# Patient Record
Sex: MALE | Race: WHITE | Employment: OTHER | URBAN - METROPOLITAN AREA
[De-identification: names, ages, dates, MRNs, and addresses within clinical notes are randomized per-mention and may not be internally consistent; named-entity substitution may affect disease eponyms.]

---

## 2019-04-01 ENCOUNTER — HOSPITAL ENCOUNTER (OUTPATIENT)
Dept: LAB | Age: 64
Discharge: HOME | End: 2019-04-01

## 2019-04-01 PROCEDURE — 88305 TISSUE EXAM BY PATHOLOGIST: CPT

## 2019-05-02 ENCOUNTER — HOSPITAL ENCOUNTER (OUTPATIENT)
Dept: SURGERY | Age: 64
Discharge: HOME | End: 2019-05-02
Attending: ORTHOPAEDIC SURGERY
Payer: MEDICARE

## 2019-05-02 VITALS
RESPIRATION RATE: 18 BRPM | BODY MASS INDEX: 33.18 KG/M2 | SYSTOLIC BLOOD PRESSURE: 116 MMHG | OXYGEN SATURATION: 96 % | DIASTOLIC BLOOD PRESSURE: 81 MMHG | TEMPERATURE: 96.1 F | HEIGHT: 77 IN | HEART RATE: 70 BPM | WEIGHT: 281 LBS

## 2019-05-02 LAB
ANION GAP SERPL CALC-SCNC: 9 MMOL/L
APPEARANCE UR: CLEAR
APTT PPP: 27.8 SEC (ref 24.7–39.8)
ATRIAL RATE: 227 BPM
BACTERIA SPEC CULT: NORMAL
BASOPHILS # BLD: 0 K/UL (ref 0–0.2)
BASOPHILS NFR BLD: 1 % (ref 0–2)
BILIRUB UR QL: NEGATIVE
BUN SERPL-MCNC: 13 MG/DL (ref 8–23)
CALCIUM SERPL-MCNC: 9.1 MG/DL (ref 8.3–10.4)
CALCULATED R AXIS, ECG10: 38 DEGREES
CALCULATED T AXIS, ECG11: 54 DEGREES
CHLORIDE SERPL-SCNC: 108 MMOL/L (ref 98–107)
CO2 SERPL-SCNC: 26 MMOL/L (ref 21–32)
COLOR UR: YELLOW
CREAT SERPL-MCNC: 0.94 MG/DL (ref 0.8–1.5)
DIAGNOSIS, 93000: NORMAL
DIFFERENTIAL METHOD BLD: ABNORMAL
EOSINOPHIL # BLD: 0.1 K/UL (ref 0–0.8)
EOSINOPHIL NFR BLD: 2 % (ref 0.5–7.8)
ERYTHROCYTE [DISTWIDTH] IN BLOOD BY AUTOMATED COUNT: 12.5 % (ref 11.9–14.6)
GLUCOSE SERPL-MCNC: 63 MG/DL (ref 65–100)
GLUCOSE UR STRIP.AUTO-MCNC: NEGATIVE MG/DL
HCT VFR BLD AUTO: 44.7 % (ref 41.1–50.3)
HGB BLD-MCNC: 15 G/DL (ref 13.6–17.2)
HGB UR QL STRIP: NEGATIVE
IMM GRANULOCYTES # BLD AUTO: 0 K/UL (ref 0–0.5)
IMM GRANULOCYTES NFR BLD AUTO: 0 % (ref 0–5)
INR PPP: 1
KETONES UR QL STRIP.AUTO: NEGATIVE MG/DL
LEUKOCYTE ESTERASE UR QL STRIP.AUTO: NEGATIVE
LYMPHOCYTES # BLD: 2 K/UL (ref 0.5–4.6)
LYMPHOCYTES NFR BLD: 34 % (ref 13–44)
MCH RBC QN AUTO: 32.9 PG (ref 26.1–32.9)
MCHC RBC AUTO-ENTMCNC: 33.6 G/DL (ref 31.4–35)
MCV RBC AUTO: 98 FL (ref 79.6–97.8)
MONOCYTES # BLD: 0.5 K/UL (ref 0.1–1.3)
MONOCYTES NFR BLD: 9 % (ref 4–12)
NEUTS SEG # BLD: 3.3 K/UL (ref 1.7–8.2)
NEUTS SEG NFR BLD: 55 % (ref 43–78)
NITRITE UR QL STRIP.AUTO: NEGATIVE
NRBC # BLD: 0 K/UL (ref 0–0.2)
PH UR STRIP: 6 [PH] (ref 5–9)
PLATELET # BLD AUTO: 168 K/UL (ref 150–450)
PMV BLD AUTO: 9.7 FL (ref 9.4–12.3)
POTASSIUM SERPL-SCNC: 4 MMOL/L (ref 3.5–5.1)
PROT UR STRIP-MCNC: NEGATIVE MG/DL
PROTHROMBIN TIME: 13.1 SEC (ref 11.7–14.5)
Q-T INTERVAL, ECG07: 386 MS
QRS DURATION, ECG06: 90 MS
QTC CALCULATION (BEZET), ECG08: 398 MS
RBC # BLD AUTO: 4.56 M/UL (ref 4.23–5.6)
SERVICE CMNT-IMP: NORMAL
SODIUM SERPL-SCNC: 143 MMOL/L (ref 136–145)
SP GR UR REFRACTOMETRY: 1.01 (ref 1–1.02)
UROBILINOGEN UR QL STRIP.AUTO: 1 EU/DL (ref 0.2–1)
VENTRICULAR RATE, ECG03: 64 BPM
WBC # BLD AUTO: 6 K/UL (ref 4.3–11.1)

## 2019-05-02 PROCEDURE — 81003 URINALYSIS AUTO W/O SCOPE: CPT

## 2019-05-02 PROCEDURE — 85025 COMPLETE CBC W/AUTO DIFF WBC: CPT

## 2019-05-02 PROCEDURE — 93005 ELECTROCARDIOGRAM TRACING: CPT | Performed by: ANESTHESIOLOGY

## 2019-05-02 PROCEDURE — 87641 MR-STAPH DNA AMP PROBE: CPT

## 2019-05-02 PROCEDURE — 80048 BASIC METABOLIC PNL TOTAL CA: CPT

## 2019-05-02 PROCEDURE — 85730 THROMBOPLASTIN TIME PARTIAL: CPT

## 2019-05-02 PROCEDURE — 85610 PROTHROMBIN TIME: CPT

## 2019-05-02 RX ORDER — CELECOXIB 200 MG/1
200 CAPSULE ORAL DAILY
COMMUNITY

## 2019-05-02 RX ORDER — TAMSULOSIN HYDROCHLORIDE 0.4 MG/1
0.4 CAPSULE ORAL DAILY
COMMUNITY

## 2019-05-02 RX ORDER — HYDROCODONE BITARTRATE AND ACETAMINOPHEN 7.5; 325 MG/1; MG/1
1 TABLET ORAL
COMMUNITY
End: 2019-09-13

## 2019-05-02 RX ORDER — IBUPROFEN 200 MG
600 TABLET ORAL
COMMUNITY
End: 2019-09-13

## 2019-05-02 NOTE — PERIOP NOTES
Patient verified name and  Order for consent was found in EHR and matches case posting; patient verified. Type 3 surgery, PAT walk in assessment complete. Labs per surgeon: CBC, BMP, PT/PTT, UA and MRSA swab; results within anesthesia guidelines Labs per anesthesia protocol: No additional labs required EKG:  EKG tracing with AFIB noted. Patient denies any prior diagnosis of Afib. Dr. New Sanders notified and stated patient needed cardiac clearance prior to surgery. Left message with Rowdy Beltre at Dr. Edmundo Arce office patient needed cardiac clearance prior to surgery and he does not have cardiologist. 
 
Sharron Winter request for last office visit and any cardiac records from Dr. Erin Louis office in [facility]. Hibiclens and instructions given per hospital policy. Patient provided with and instructed on educational handouts including Guide to Surgery, Pain Management, Hand Hygiene, Blood Transfusion Education, and [place] [facility] Brochure. Patient answered medical/surgical history questions at their best of ability. All prior to admission medications documented in [facility]. Original medication prescription bottle not visualized during patient appointment. Patient instructed to hold all vitamins 7 days prior to surgery and NSAIDS 5 days prior to surgery, patient verbalized understanding. Prescription medications to hold: Ibuprofen x 5 days Patient instructed to continue previous medications as prescribed prior to surgery and to take the following medications the day of surgery according to anesthesia guidelines with a small sip of water: Celebrex, Hydrocodone and Flomax. Patient teach back successful and patient demonstrates knowledge of instructions.

## 2019-05-02 NOTE — PERIOP NOTES
Lab results within anesthesia guidelines. Recent Results (from the past 12 hour(s)) CBC WITH AUTOMATED DIFF Collection Time: 05/02/19 10:05 AM  
Result Value Ref Range WBC 6.0 4.3 - 11.1 K/uL  
 RBC 4.56 4.23 - 5.6 M/uL  
 HGB 15.0 13.6 - 17.2 g/dL HCT 44.7 41.1 - 50.3 % MCV 98.0 (H) 79.6 - 97.8 FL  
 MCH 32.9 26.1 - 32.9 PG  
 MCHC 33.6 31.4 - 35.0 g/dL  
 RDW 12.5 11.9 - 14.6 % PLATELET 436 498 - 011 K/uL MPV 9.7 9.4 - 12.3 FL ABSOLUTE NRBC 0.00 0.0 - 0.2 K/uL  
 DF AUTOMATED NEUTROPHILS 55 43 - 78 % LYMPHOCYTES 34 13 - 44 % MONOCYTES 9 4.0 - 12.0 % EOSINOPHILS 2 0.5 - 7.8 % BASOPHILS 1 0.0 - 2.0 % IMMATURE GRANULOCYTES 0 0.0 - 5.0 %  
 ABS. NEUTROPHILS 3.3 1.7 - 8.2 K/UL  
 ABS. LYMPHOCYTES 2.0 0.5 - 4.6 K/UL  
 ABS. MONOCYTES 0.5 0.1 - 1.3 K/UL  
 ABS. EOSINOPHILS 0.1 0.0 - 0.8 K/UL  
 ABS. BASOPHILS 0.0 0.0 - 0.2 K/UL  
 ABS. IMM. GRANS. 0.0 0.0 - 0.5 K/UL METABOLIC PANEL, BASIC Collection Time: 05/02/19 10:05 AM  
Result Value Ref Range Sodium 143 136 - 145 mmol/L Potassium 4.0 3.5 - 5.1 mmol/L Chloride 108 (H) 98 - 107 mmol/L  
 CO2 26 21 - 32 mmol/L Anion gap 9 mmol/L Glucose 63 (L) 65 - 100 mg/dL BUN 13 8 - 23 MG/DL Creatinine 0.94 0.8 - 1.5 MG/DL  
 GFR est AA >60 >60 ml/min/1.73m2 GFR est non-AA >60 ml/min/1.73m2 Calcium 9.1 8.3 - 10.4 MG/DL PROTHROMBIN TIME + INR Collection Time: 05/02/19 10:05 AM  
Result Value Ref Range Prothrombin time 13.1 11.7 - 14.5 sec INR 1.0    
PTT Collection Time: 05/02/19 10:05 AM  
Result Value Ref Range aPTT 27.8 24.7 - 39.8 SEC URINALYSIS W/ RFLX MICROSCOPIC Collection Time: 05/02/19 10:56 AM  
Result Value Ref Range Color YELLOW Appearance CLEAR Specific gravity 1.013 1.001 - 1.023    
 pH (UA) 6.0 5.0 - 9.0 Protein NEGATIVE  NEG mg/dL Glucose NEGATIVE  mg/dL Ketone NEGATIVE  NEG mg/dL Bilirubin NEGATIVE  NEG  Blood NEGATIVE  NEG    
 Urobilinogen 1.0 0.2 - 1.0 EU/dL Nitrites NEGATIVE  NEG  Leukocyte Esterase NEGATIVE  NEG

## 2019-05-03 NOTE — PERIOP NOTES
Minoo Hale (MA to surgeon) returned call and stated pt has cardiac clearance appt with Dr Alejandro Navarro [facility]) on Tuesday, 5/7/19 at 1100. Will have PAT charge RN follow up after appt 5/7/19.

## 2019-05-06 NOTE — ADVANCED PRACTICE NURSE
Total Joint Surgery Preoperative Chart Review Patient ID: [facility] Cadence ROJAS [id number] 
71 y.o. 
1955 Surgeon: Dr. Chaz Bliss Date of Surgery: 2019 Procedure: Total Right Knee Arthroplasty Primary Care Physician: Niraj Flaherty [phone] Specialty Physician(s):   
 
Subjective:  
[facility] Cadence ROJAS is a 71 y.o. WHITE OR  male who presents for preoperative evaluation for Total Right Knee arthroplasty. This is a preoperative chart review note based on data collected by the nurse at the surgical Pre-Assessment visit. Past Medical History:  
Diagnosis Date  Arthritis  BPH associated with nocturia   
 managed with medication  Chronic pain   
 lower back and legs Past Surgical History:  
Procedure Laterality Date  HX COLONOSCOPY    
 5 polyps removed  HX HERNIA REPAIR Right  [facility] Dr. Lobito Powell  HX KNEE ARTHROSCOPY Right  States \"either  or \" No family history on file. Social History Tobacco Use  Smoking status: Former Smoker Last attempt to quit: 2016 Years since quittin.3  Smokeless tobacco: Never Used Substance Use Topics  Alcohol use: Not on file Comment: occasional  
   
Prior to Admission medications Medication Sig Start Date End Date Taking? Authorizing Provider  
ibuprofen (ADVIL) 200 mg tablet Take 600 mg by mouth every six (6) hours as needed for Pain. Yes Provider, Historical  
HYDROcodone-acetaminophen (NORCO) 7.5-325 mg per tablet Take 1 Tab by mouth every six (6) hours as needed for Pain. Yes Provider, Historical  
celecoxib (CELEBREX) 200 mg capsule Take 200 mg by mouth daily. Yes Provider, Historical  
tamsulosin (FLOMAX) 0.4 mg capsule Take 0.4 mg by mouth daily. Yes Provider, Historical  
 
No Known Allergies Objective:  
 
Physical Exam:  
 
 
ECG:   
EKG Results Procedure 720 Value Units Date/Time EKG, 12 LEAD, INITIAL [[id number]] Collected:  05/02/19 0914 Order Status:  Completed Updated:  05/02/19 1231 Ventricular Rate 64 BPM   
  Atrial Rate 227 BPM   
  QRS Duration 90 ms Q-T Interval 386 ms QTC Calculation (Bezet) 398 ms Calculated R Axis 38 degrees Calculated T Axis 54 degrees Diagnosis -- Atrial fibrillation with premature ventricular or aberrantly conducted  
complexes Cannot rule out Anterior infarct (cited on or before 02-MAY-2019) Abnormal ECG When compared with ECG of 02-MAY-2019 09:13, 
Previous ECG has undetermined rhythm, needs review Confirmed by Francisca Srinivasan MD (), Robert Arango ([id number]) on 5/2/2019 12:30:43 PM 
  
  
 
 
Data Review:  
Labs:  
 
Results for Rigo Machado \"DENVER\" (MRN [id number]) as of 5/6/2019 15:46 Ref. Range 5/2/2019 10:05 Sodium Latest Ref Range: 136 - 145 mmol/L 143 Potassium Latest Ref Range: 3.5 - 5.1 mmol/L 4.0 Chloride Latest Ref Range: 98 - 107 mmol/L 108 (H) CO2 Latest Ref Range: 21 - 32 mmol/L 26 Anion gap Latest Units: mmol/L 9 Glucose Latest Ref Range: 65 - 100 mg/dL 63 (L) BUN Latest Ref Range: 8 - 23 MG/DL 13 Creatinine Latest Ref Range: 0.8 - 1.5 MG/DL 0.94 Calcium Latest Ref Range: 8.3 - 10.4 MG/DL 9.1 GFR est non-AA Latest Units: ml/min/1.73m2 >60  
GFR est AA Latest Ref Range: >60 ml/min/1.73m2 >60 Total Joint Surgery Pre-Assessment Recommendations:          
none Signed By: YAMIL Fierro May 6, 2019

## 2019-05-07 NOTE — PERIOP NOTES
[facility] office note dated 5/7/19 in EMR under Care Everywhere that reads: 
 
Assessment & Plan Note - Barbara Hogan MD - 05/07/2019 11:51 AM EDT Associated Problem(s): Preop cardiovascular exam   
No angina or CHF or overt high risk features, but new AF and LBBB--needs further evaluation before elective surgery   
 
 
 
Pt started on Eliquis today and echo, stress, Holter and 
pulm referral needed per note. Lachelle Cameron @ Dr. Rodriguez Call' office notified of above.

## 2019-09-05 ENCOUNTER — HOSPITAL ENCOUNTER (OUTPATIENT)
Dept: SURGERY | Age: 64
Discharge: HOME | End: 2019-09-05
Attending: ORTHOPAEDIC SURGERY
Payer: MEDICARE

## 2019-09-05 VITALS
TEMPERATURE: 98.2 F | WEIGHT: 272 LBS | SYSTOLIC BLOOD PRESSURE: 148 MMHG | RESPIRATION RATE: 18 BRPM | HEART RATE: 69 BPM | BODY MASS INDEX: 32.12 KG/M2 | OXYGEN SATURATION: 94 % | DIASTOLIC BLOOD PRESSURE: 91 MMHG | HEIGHT: 77 IN

## 2019-09-05 LAB
ANION GAP SERPL CALC-SCNC: 8 MMOL/L (ref 7–16)
APTT PPP: 29.2 SEC (ref 24.7–39.8)
BACTERIA SPEC CULT: NORMAL
BUN SERPL-MCNC: 13 MG/DL (ref 8–23)
CALCIUM SERPL-MCNC: 9.1 MG/DL (ref 8.3–10.4)
CHLORIDE SERPL-SCNC: 109 MMOL/L (ref 98–107)
CO2 SERPL-SCNC: 24 MMOL/L (ref 21–32)
CREAT SERPL-MCNC: 0.75 MG/DL (ref 0.8–1.5)
ERYTHROCYTE [DISTWIDTH] IN BLOOD BY AUTOMATED COUNT: 12.2 % (ref 11.9–14.6)
GLUCOSE SERPL-MCNC: 97 MG/DL (ref 65–100)
HCT VFR BLD AUTO: 40.9 % (ref 41.1–50.3)
HGB BLD-MCNC: 14.1 G/DL (ref 13.6–17.2)
INR PPP: 1
MCH RBC QN AUTO: 33.1 PG (ref 26.1–32.9)
MCHC RBC AUTO-ENTMCNC: 34.5 G/DL (ref 31.4–35)
MCV RBC AUTO: 96 FL (ref 79.6–97.8)
NRBC # BLD: 0 K/UL (ref 0–0.2)
PLATELET # BLD AUTO: 181 K/UL (ref 150–450)
PMV BLD AUTO: 9.3 FL (ref 9.4–12.3)
POTASSIUM SERPL-SCNC: 4.1 MMOL/L (ref 3.5–5.1)
PROTHROMBIN TIME: 12.9 SEC (ref 11.7–14.5)
RBC # BLD AUTO: 4.26 M/UL (ref 4.23–5.6)
SERVICE CMNT-IMP: NORMAL
SODIUM SERPL-SCNC: 141 MMOL/L (ref 136–145)
WBC # BLD AUTO: 5 K/UL (ref 4.3–11.1)

## 2019-09-05 PROCEDURE — 87641 MR-STAPH DNA AMP PROBE: CPT

## 2019-09-05 PROCEDURE — 85730 THROMBOPLASTIN TIME PARTIAL: CPT

## 2019-09-05 PROCEDURE — 85027 COMPLETE CBC AUTOMATED: CPT

## 2019-09-05 PROCEDURE — 85610 PROTHROMBIN TIME: CPT

## 2019-09-05 PROCEDURE — 80048 BASIC METABOLIC PNL TOTAL CA: CPT

## 2019-09-05 RX ORDER — ASPIRIN 81 MG/1
81 TABLET ORAL DAILY
COMMUNITY
End: 2019-09-13

## 2019-09-05 NOTE — PERIOP NOTES
Labs dated 9/5/19 routed via [facility] to patients PCP, Dr. Perez Daily, per Dr. Robbin Herrmann' request.    The below lab results are within anesthesia limits, no further action is required.      Recent Results (from the past 12 hour(s))   CBC W/O DIFF    Collection Time: 09/05/19 10:45 AM   Result Value Ref Range    WBC 5.0 4.3 - 11.1 K/uL    RBC 4.26 4.23 - 5.6 M/uL    HGB 14.1 13.6 - 17.2 g/dL    HCT 40.9 (L) 41.1 - 50.3 %    MCV 96.0 79.6 - 97.8 FL    MCH 33.1 (H) 26.1 - 32.9 PG    MCHC 34.5 31.4 - 35.0 g/dL    RDW 12.2 11.9 - 14.6 %    PLATELET 556 319 - 125 K/uL    MPV 9.3 (L) 9.4 - 12.3 FL    ABSOLUTE NRBC 0.00 0.0 - 0.2 K/uL   METABOLIC PANEL, BASIC    Collection Time: 09/05/19 10:45 AM   Result Value Ref Range    Sodium 141 136 - 145 mmol/L    Potassium 4.1 3.5 - 5.1 mmol/L    Chloride 109 (H) 98 - 107 mmol/L    CO2 24 21 - 32 mmol/L    Anion gap 8 7 - 16 mmol/L    Glucose 97 65 - 100 mg/dL    BUN 13 8 - 23 MG/DL    Creatinine 0.75 (L) 0.8 - 1.5 MG/DL    GFR est AA >60 >60 ml/min/1.73m2    GFR est non-AA >60 >60 ml/min/1.73m2    Calcium 9.1 8.3 - 10.4 MG/DL   PROTHROMBIN TIME + INR    Collection Time: 09/05/19 10:45 AM   Result Value Ref Range    Prothrombin time 12.9 11.7 - 14.5 sec    INR 1.0     PTT    Collection Time: 09/05/19 10:45 AM   Result Value Ref Range    aPTT 29.2 24.7 - 39.8 SEC

## 2019-09-05 NOTE — PERIOP NOTES
Recent Results (from the past 12 hour(s))   CBC W/O DIFF    Collection Time: 09/05/19 10:45 AM   Result Value Ref Range    WBC 5.0 4.3 - 11.1 K/uL    RBC 4.26 4.23 - 5.6 M/uL    HGB 14.1 13.6 - 17.2 g/dL    HCT 40.9 (L) 41.1 - 50.3 %    MCV 96.0 79.6 - 97.8 FL    MCH 33.1 (H) 26.1 - 32.9 PG    MCHC 34.5 31.4 - 35.0 g/dL    RDW 12.2 11.9 - 14.6 %    PLATELET 536 021 - 827 K/uL    MPV 9.3 (L) 9.4 - 12.3 FL    ABSOLUTE NRBC 0.00 0.0 - 0.2 K/uL   METABOLIC PANEL, BASIC    Collection Time: 09/05/19 10:45 AM   Result Value Ref Range    Sodium 141 136 - 145 mmol/L    Potassium 4.1 3.5 - 5.1 mmol/L    Chloride 109 (H) 98 - 107 mmol/L    CO2 24 21 - 32 mmol/L    Anion gap 8 7 - 16 mmol/L    Glucose 97 65 - 100 mg/dL    BUN 13 8 - 23 MG/DL    Creatinine 0.75 (L) 0.8 - 1.5 MG/DL    GFR est AA >60 >60 ml/min/1.73m2    GFR est non-AA >60 >60 ml/min/1.73m2    Calcium 9.1 8.3 - 10.4 MG/DL   PROTHROMBIN TIME + INR    Collection Time: 09/05/19 10:45 AM   Result Value Ref Range    Prothrombin time 12.9 11.7 - 14.5 sec    INR 1.0     PTT    Collection Time: 09/05/19 10:45 AM   Result Value Ref Range    aPTT 29.2 24.7 - 39.8 SEC

## 2019-09-05 NOTE — PERIOP NOTES
Patient verified name and . Order for consent found in EHR and matches case posting; patient verified. Type 3 surgery, PAT walk-in joint assessment complete. Labs per surgeon: cbc, bmp, pt/inr, ptt; results pending. T&S DOS; order signed and held in EHR. Labs per anesthesia protocol: All required lab work included in surgeon's orders. EKG: completed 19 at Cardiology office visit--tracing requested to be faxed to [phone]. Charge nurse to follow up. Patient was dx with new onset afib during previous joint camp on 19. Shabbir and DCCV performed on 19 patient has maintained NSR since. Eliquis d/c at cardiology office visit on 19. Cardiology note (19) placed on chart if needed for anesthesia reference. Echo (19) and Stress (19) located in EHR under Care Everywhere if needed for anesthesia protocol. Patient has previous incentive spirometer from previous joint camp. Patient provided verbal and written instructions on incentive spirometer use. Patient instructed to bring incentive spirometer to the hospital on the DOS. Patient verbalized understanding. MRSA/MSSA swab collected; pharmacy to review and dose antibiotic as appropriate. Hospital approved surgical skin cleanser and instructions to return bottle on DOS given per hospital policy. Patient provided with handouts including Guide to Surgery, Pain Management, Hand Hygiene, Blood Transfusion Education, and [place] [facility] Brochure. Patient answered medical/surgical history questions at their best of ability. All prior to admission medications documented in [facility] Care. Original medication prescription bottle NOT visualized during patient appointment. Patient instructed to hold all vitamins/supplements/herbals 7 days prior to surgery and NSAIDS 5 days prior to surgery.     Due to hx of atrial fibrillation, patient instructed to begin taking a daily 81 mg ASA per anesthesia protocol. Patient instructed to continue previous medications as prescribed prior to surgery and to take the following medications the day of surgery according to anesthesia guidelines with a small sip of water: 81 mg ASA and Hydrocodone PRN. Patient teach back successful and patient demonstrates knowledge of instruction.

## 2019-09-10 ENCOUNTER — ANESTHESIA EVENT (OUTPATIENT)
Dept: SURGERY | Age: 64
DRG: 470 | End: 2019-09-10
Payer: MEDICARE

## 2019-09-10 NOTE — H&P
[facility]  Pre Operative History and Physical Exam    Patient ID:  Cornelius Vargas  [id number]  89 y.o.  1955    Today: September 10, 2019       Assessment:   1. Arthritis of the right knee        Plan:    1. Proceed with scheduled Procedure(s) (LRB):  KNEE ARTHROPLASTY TOTAL/ RIGHT/ MAE/ FNB (Right)            CC:  Right knee pain    HPI:   The patient has end stage arthritis of the right knee. The patient was evaluated and examined during a consultation prior to this office visit. There have been no changes to the patient's orthopedic condition since the initial consultation. The patient has failed previous conservative treatment for this condition including antiinflammatories , and lifestyle modifications. The necessity for joint replacement is present. The patient will be admitted the day of surgery for Procedure(s) (LRB):  KNEE ARTHROPLASTY TOTAL/ RIGHT/ MAE/ FNB (Right)      Past Medical/Surgical History:  Past Medical History:   Diagnosis Date    Arthritis     Atrial fibrillation ([address] Dx 5/2019    BRIT and DCCV performed (6/20/19) and he has maintained NSR since.  EKG dated 7/19/19 shows NRS    BPH associated with nocturia     managed with medication    Chronic pain     lower back and legs    H/O echocardiogram 07/18/2019    EF 55-65%    LBBB (left bundle branch block)     Stress (5/21/19) \"no ischemia,\" Echo (7/18/19) EF 55-65%     Past Surgical History:   Procedure Laterality Date    HX AFIB ABLATION      cardioversion     HX COLONOSCOPY  2019    5 polyps removed    HX HERNIA REPAIR Right 2014    [facility]. Asiya Baptiste HX KNEE ARTHROSCOPY Right 2005    States \"either 2005 or 2007\"        Allergies: No Known Allergies     Physical Exam:   General: NAD, Alert, Oriented, Appears their stated age     [de-identified]: NC/AT, PERRL    Skin: No rashes, lesions or wounds seen      Psych: normal affect      Heart: Regular Rate, Rhythm     Lungs: unlabored respirations, normal breath sounds     Abdomen: Soft and non-distended     Ortho: Pain with limited ROM of the right knee    Neuro: no focal defects, sensation is equal bilaterally     Lymph: no lymphadenopathy     Meds:   No current facility-administered medications for this encounter. Current Outpatient Medications   Medication Sig    aspirin delayed-release 81 mg tablet Take 81 mg by mouth daily.  ibuprofen (ADVIL) 200 mg tablet Take 600 mg by mouth every six (6) hours as needed for Pain.  HYDROcodone-acetaminophen (NORCO) 7.5-325 mg per tablet Take 1 Tab by mouth every six (6) hours as needed for Pain.  celecoxib (CELEBREX) 200 mg capsule Take 200 mg by mouth daily.  tamsulosin (FLOMAX) 0.4 mg capsule Take 0.4 mg by mouth daily. Labs:  Hospital Outpatient Visit on 09/05/2019   Component Date Value Ref Range Status    WBC 09/05/2019 5.0  4.3 - 11.1 K/uL Final    RBC 09/05/2019 4.26  4.23 - 5.6 M/uL Final    HGB 09/05/2019 14.1  13.6 - 17.2 g/dL Final    HCT 09/05/2019 40.9* 41.1 - 50.3 % Final    MCV 09/05/2019 96.0  79.6 - 97.8 FL Final    MCH 09/05/2019 33.1* 26.1 - 32.9 PG Final    MCHC 09/05/2019 34.5  31.4 - 35.0 g/dL Final    RDW 09/05/2019 12.2  11.9 - 14.6 % Final    PLATELET 99/45/9411 426  150 - 450 K/uL Final    MPV 09/05/2019 9.3* 9.4 - 12.3 FL Final    ABSOLUTE NRBC 09/05/2019 0.00  0.0 - 0.2 K/uL Final    **Note: Absolute NRBC parameter is now reported with Hemogram**    Sodium 09/05/2019 141  136 - 145 mmol/L Final    Potassium 09/05/2019 4.1  3.5 - 5.1 mmol/L Final    Chloride 09/05/2019 109* 98 - 107 mmol/L Final    CO2 09/05/2019 24  21 - 32 mmol/L Final    Anion gap 09/05/2019 8  7 - 16 mmol/L Final    Glucose 09/05/2019 97  65 - 100 mg/dL Final    Comment: 47 - 60 mg/dl Consistent with, but not fully diagnostic of hypoglycemia.   101 - 125 mg/dl Impaired fasting glucose/consistent with pre-diabetes mellitus  > 126 mg/dl Fasting glucose consistent with overt diabetes mellitus      BUN 09/05/2019 13  8 - 23 MG/DL Final    Creatinine 09/05/2019 0.75* 0.8 - 1.5 MG/DL Final    GFR est AA 09/05/2019 >60  >60 ml/min/1.73m2 Final    GFR est non-AA 09/05/2019 >60  >60 ml/min/1.73m2 Final    Comment: (NOTE)  Estimated GFR is calculated using the Modification of Diet in Renal   Disease (MDRD) Study equation, reported for both  Americans   (GFRAA) and non- Americans (GFRNA), and normalized to 1.73m2   body surface area. The physician must decide which value applies to   the patient. The MDRD study equation should only be used in   individuals age 25 or older. It has not been validated for the   following: pregnant women, patients with serious comorbid conditions,   or on certain medications, or persons with extremes of body size,   muscle mass, or nutritional status.  Calcium 09/05/2019 9.1  8.3 - 10.4 MG/DL Final    Prothrombin time 09/05/2019 12.9  11.7 - 14.5 sec Final    INR 09/05/2019 1.0    Final    Comment: Suggested therapeutic INR range:  Venous thrombosis and embolus  2.0-3.0  Prosthetic heart valve         2.5-3.5  ** Note new reference range and method **      aPTT 09/05/2019 29.2  24.7 - 39.8 SEC Final    Comment: Heparin Therapeutic Range = 74 - 123 seconds  In addition to factor deficiency, monitoring heparin therapy, etc., evaluation of a prolonged aPTT result should include consideration of preanalytic variables such as heparin flush contamination, specimen integrity issues, etc.      Special Requests: 09/05/2019 NASAL    Final    Culture result: 09/05/2019 SA target not detected. A MRSA NEGATIVE, SA NEGATIVE test result does not preclude MRSA or SA nasal colonization. Final                 There is no problem list on file for this patient.         Signed By: Maribel Carmona NP  September 10, 2019

## 2019-09-11 ENCOUNTER — ANESTHESIA (OUTPATIENT)
Dept: SURGERY | Age: 64
DRG: 470 | End: 2019-09-11
Payer: MEDICARE

## 2019-09-11 ENCOUNTER — HOSPITAL ENCOUNTER (INPATIENT)
Age: 64
LOS: 2 days | Discharge: HOME HEALTH | DRG: 470 | End: 2019-09-13
Attending: ORTHOPAEDIC SURGERY | Admitting: ORTHOPAEDIC SURGERY
Payer: MEDICARE

## 2019-09-11 DIAGNOSIS — Z96.651 STATUS POST TOTAL KNEE REPLACEMENT, RIGHT: Primary | ICD-10-CM

## 2019-09-11 PROBLEM — M17.11 OSTEOARTHRITIS OF RIGHT KNEE: Status: ACTIVE | Noted: 2019-09-11

## 2019-09-11 PROBLEM — M19.90 OSTEOARTHRITIS: Status: ACTIVE | Noted: 2019-09-11

## 2019-09-11 LAB
GLUCOSE BLD STRIP.AUTO-MCNC: 90 MG/DL (ref 65–100)
HGB BLD-MCNC: 12.5 G/DL (ref 13.6–17.2)

## 2019-09-11 PROCEDURE — 76010000162 HC OR TIME 1.5 TO 2 HR INTENSV-TIER 1: Performed by: ORTHOPAEDIC SURGERY

## 2019-09-11 PROCEDURE — 97110 THERAPEUTIC EXERCISES: CPT

## 2019-09-11 PROCEDURE — 77030018836 HC SOL IRR NACL ICUM -A: Performed by: ORTHOPAEDIC SURGERY

## 2019-09-11 PROCEDURE — 77010033678 HC OXYGEN DAILY

## 2019-09-11 PROCEDURE — 77030035236 HC SUT PDS STRATFX BARB J&J -B: Performed by: ORTHOPAEDIC SURGERY

## 2019-09-11 PROCEDURE — 76060000034 HC ANESTHESIA 1.5 TO 2 HR: Performed by: ORTHOPAEDIC SURGERY

## 2019-09-11 PROCEDURE — 36415 COLL VENOUS BLD VENIPUNCTURE: CPT

## 2019-09-11 PROCEDURE — 77030002912 HC SUT ETHBND J&J -A: Performed by: ORTHOPAEDIC SURGERY

## 2019-09-11 PROCEDURE — 74011000258 HC RX REV CODE- 258: Performed by: ORTHOPAEDIC SURGERY

## 2019-09-11 PROCEDURE — 94762 N-INVAS EAR/PLS OXIMTRY CONT: CPT

## 2019-09-11 PROCEDURE — 77030008462 HC STPLR SKN PROX J&J -A: Performed by: ORTHOPAEDIC SURGERY

## 2019-09-11 PROCEDURE — 97165 OT EVAL LOW COMPLEX 30 MIN: CPT

## 2019-09-11 PROCEDURE — 0SRC0JA REPLACEMENT OF RIGHT KNEE JOINT WITH SYNTHETIC SUBSTITUTE, UNCEMENTED, OPEN APPROACH: ICD-10-PCS | Performed by: ORTHOPAEDIC SURGERY

## 2019-09-11 PROCEDURE — 74011000250 HC RX REV CODE- 250

## 2019-09-11 PROCEDURE — 74011250636 HC RX REV CODE- 250/636: Performed by: ORTHOPAEDIC SURGERY

## 2019-09-11 PROCEDURE — 74011250636 HC RX REV CODE- 250/636: Performed by: PHYSICIAN ASSISTANT

## 2019-09-11 PROCEDURE — 77030010509 HC AIRWY LMA MSK TELE -A: Performed by: NURSE ANESTHETIST, CERTIFIED REGISTERED

## 2019-09-11 PROCEDURE — 74011250637 HC RX REV CODE- 250/637: Performed by: PHYSICIAN ASSISTANT

## 2019-09-11 PROCEDURE — 74011250636 HC RX REV CODE- 250/636

## 2019-09-11 PROCEDURE — 74011000250 HC RX REV CODE- 250: Performed by: ORTHOPAEDIC SURGERY

## 2019-09-11 PROCEDURE — 76210000006 HC OR PH I REC 0.5 TO 1 HR: Performed by: ORTHOPAEDIC SURGERY

## 2019-09-11 PROCEDURE — 77030020782 HC GWN BAIR PAWS FLX 3M -B: Performed by: NURSE ANESTHETIST, CERTIFIED REGISTERED

## 2019-09-11 PROCEDURE — 77030013708 HC HNDPC SUC IRR PULS STRY –B: Performed by: ORTHOPAEDIC SURGERY

## 2019-09-11 PROCEDURE — 85018 HEMOGLOBIN: CPT

## 2019-09-11 PROCEDURE — 77030007880 HC KT SPN EPDRL BBMI -B: Performed by: NURSE ANESTHETIST, CERTIFIED REGISTERED

## 2019-09-11 PROCEDURE — 82962 GLUCOSE BLOOD TEST: CPT

## 2019-09-11 PROCEDURE — 77030002966 HC SUT PDS J&J -A: Performed by: ORTHOPAEDIC SURGERY

## 2019-09-11 PROCEDURE — 77030003602 HC NDL NRV BLK BBMI -B: Performed by: NURSE ANESTHETIST, CERTIFIED REGISTERED

## 2019-09-11 PROCEDURE — 74011250636 HC RX REV CODE- 250/636: Performed by: ANESTHESIOLOGY

## 2019-09-11 PROCEDURE — 76010010054 HC POST OP PAIN BLOCK: Performed by: ORTHOPAEDIC SURGERY

## 2019-09-11 PROCEDURE — 77030034849: Performed by: ORTHOPAEDIC SURGERY

## 2019-09-11 PROCEDURE — 77030019557 HC ELECTRD VES SEAL MEDT -F: Performed by: ORTHOPAEDIC SURGERY

## 2019-09-11 PROCEDURE — 77030012935 HC DRSG AQUACEL BMS -B: Performed by: ORTHOPAEDIC SURGERY

## 2019-09-11 PROCEDURE — 77030031139 HC SUT VCRL2 J&J -A: Performed by: ORTHOPAEDIC SURGERY

## 2019-09-11 PROCEDURE — 97161 PT EVAL LOW COMPLEX 20 MIN: CPT

## 2019-09-11 PROCEDURE — C1776 JOINT DEVICE (IMPLANTABLE): HCPCS | Performed by: ORTHOPAEDIC SURGERY

## 2019-09-11 PROCEDURE — 65270000029 HC RM PRIVATE

## 2019-09-11 PROCEDURE — 77030003665 HC NDL SPN BBMI -A: Performed by: NURSE ANESTHETIST, CERTIFIED REGISTERED

## 2019-09-11 PROCEDURE — 94760 N-INVAS EAR/PLS OXIMETRY 1: CPT

## 2019-09-11 PROCEDURE — 77030035643 HC BLD SAW OSC PRECIS STRY -C: Performed by: ORTHOPAEDIC SURGERY

## 2019-09-11 PROCEDURE — 77030006835 HC BLD SAW SAG STRY -B: Performed by: ORTHOPAEDIC SURGERY

## 2019-09-11 PROCEDURE — 77030006720 HC BLD PAT RMR ZIMM -B: Performed by: ORTHOPAEDIC SURGERY

## 2019-09-11 PROCEDURE — 76942 ECHO GUIDE FOR BIOPSY: CPT | Performed by: ORTHOPAEDIC SURGERY

## 2019-09-11 DEVICE — IMPLANTABLE DEVICE: Type: IMPLANTABLE DEVICE | Site: KNEE | Status: FUNCTIONAL

## 2019-09-11 DEVICE — BASEPLATE TIB SZ 8 AP60MM ML85MM KEEL W58MM D28MM PEG L12MM: Type: IMPLANTABLE DEVICE | Site: KNEE | Status: FUNCTIONAL

## 2019-09-11 DEVICE — COMPONENT PAT SZ A40 W44MM DIA40MM THK11MM MED LAT KNEE: Type: IMPLANTABLE DEVICE | Site: KNEE | Status: FUNCTIONAL

## 2019-09-11 RX ORDER — DEXAMETHASONE SODIUM PHOSPHATE 100 MG/10ML
10 INJECTION INTRAMUSCULAR; INTRAVENOUS ONCE
Status: ACTIVE | OUTPATIENT
Start: 2019-09-12 | End: 2019-09-13

## 2019-09-11 RX ORDER — OXYCODONE HYDROCHLORIDE 5 MG/1
5 TABLET ORAL
Status: DISCONTINUED | OUTPATIENT
Start: 2019-09-11 | End: 2019-09-11 | Stop reason: HOSPADM

## 2019-09-11 RX ORDER — DEXAMETHASONE SODIUM PHOSPHATE 4 MG/ML
INJECTION, SOLUTION INTRA-ARTICULAR; INTRALESIONAL; INTRAMUSCULAR; INTRAVENOUS; SOFT TISSUE AS NEEDED
Status: DISCONTINUED | OUTPATIENT
Start: 2019-09-11 | End: 2019-09-11 | Stop reason: HOSPADM

## 2019-09-11 RX ORDER — LIDOCAINE HYDROCHLORIDE 20 MG/ML
INJECTION, SOLUTION EPIDURAL; INFILTRATION; INTRACAUDAL; PERINEURAL AS NEEDED
Status: DISCONTINUED | OUTPATIENT
Start: 2019-09-11 | End: 2019-09-11 | Stop reason: HOSPADM

## 2019-09-11 RX ORDER — ONDANSETRON 2 MG/ML
4 INJECTION INTRAMUSCULAR; INTRAVENOUS
Status: DISCONTINUED | OUTPATIENT
Start: 2019-09-11 | End: 2019-09-13 | Stop reason: HOSPADM

## 2019-09-11 RX ORDER — HYDROMORPHONE HYDROCHLORIDE 1 MG/ML
1 INJECTION, SOLUTION INTRAMUSCULAR; INTRAVENOUS; SUBCUTANEOUS
Status: DISCONTINUED | OUTPATIENT
Start: 2019-09-11 | End: 2019-09-13 | Stop reason: HOSPADM

## 2019-09-11 RX ORDER — ROPIVACAINE HYDROCHLORIDE 2 MG/ML
INJECTION, SOLUTION EPIDURAL; INFILTRATION; PERINEURAL AS NEEDED
Status: DISCONTINUED | OUTPATIENT
Start: 2019-09-11 | End: 2019-09-11 | Stop reason: HOSPADM

## 2019-09-11 RX ORDER — HYDROMORPHONE HYDROCHLORIDE 2 MG/ML
0.5 INJECTION, SOLUTION INTRAMUSCULAR; INTRAVENOUS; SUBCUTANEOUS
Status: DISCONTINUED | OUTPATIENT
Start: 2019-09-11 | End: 2019-09-11 | Stop reason: HOSPADM

## 2019-09-11 RX ORDER — HYDROMORPHONE HYDROCHLORIDE 2 MG/1
2 TABLET ORAL
Qty: 40 TAB | Refills: 0 | Status: SHIPPED | OUTPATIENT
Start: 2019-09-11 | End: 2019-10-11

## 2019-09-11 RX ORDER — ACETAMINOPHEN 10 MG/ML
1000 INJECTION, SOLUTION INTRAVENOUS ONCE
Status: COMPLETED | OUTPATIENT
Start: 2019-09-11 | End: 2019-09-11

## 2019-09-11 RX ORDER — SODIUM CHLORIDE 9 MG/ML
100 INJECTION, SOLUTION INTRAVENOUS CONTINUOUS
Status: DISPENSED | OUTPATIENT
Start: 2019-09-11 | End: 2019-09-12

## 2019-09-11 RX ORDER — NALOXONE HYDROCHLORIDE 0.4 MG/ML
0.1 INJECTION, SOLUTION INTRAMUSCULAR; INTRAVENOUS; SUBCUTANEOUS AS NEEDED
Status: DISCONTINUED | OUTPATIENT
Start: 2019-09-11 | End: 2019-09-11 | Stop reason: HOSPADM

## 2019-09-11 RX ORDER — AMOXICILLIN 250 MG
2 CAPSULE ORAL DAILY
Status: DISCONTINUED | OUTPATIENT
Start: 2019-09-12 | End: 2019-09-13 | Stop reason: HOSPADM

## 2019-09-11 RX ORDER — ASPIRIN 81 MG/1
81 TABLET ORAL EVERY 12 HOURS
Qty: 60 TAB | Refills: 1 | Status: SHIPPED | OUTPATIENT
Start: 2019-09-11 | End: 2019-10-16

## 2019-09-11 RX ORDER — SODIUM CHLORIDE 0.9 % (FLUSH) 0.9 %
5-40 SYRINGE (ML) INJECTION EVERY 8 HOURS
Status: DISCONTINUED | OUTPATIENT
Start: 2019-09-11 | End: 2019-09-13 | Stop reason: HOSPADM

## 2019-09-11 RX ORDER — SODIUM CHLORIDE 0.9 % (FLUSH) 0.9 %
5-40 SYRINGE (ML) INJECTION AS NEEDED
Status: DISCONTINUED | OUTPATIENT
Start: 2019-09-11 | End: 2019-09-13 | Stop reason: HOSPADM

## 2019-09-11 RX ORDER — LIDOCAINE HYDROCHLORIDE 10 MG/ML
0.1 INJECTION INFILTRATION; PERINEURAL AS NEEDED
Status: DISCONTINUED | OUTPATIENT
Start: 2019-09-11 | End: 2019-09-11 | Stop reason: HOSPADM

## 2019-09-11 RX ORDER — MIDAZOLAM HYDROCHLORIDE 1 MG/ML
2 INJECTION, SOLUTION INTRAMUSCULAR; INTRAVENOUS
Status: COMPLETED | OUTPATIENT
Start: 2019-09-11 | End: 2019-09-11

## 2019-09-11 RX ORDER — ONDANSETRON 2 MG/ML
INJECTION INTRAMUSCULAR; INTRAVENOUS AS NEEDED
Status: DISCONTINUED | OUTPATIENT
Start: 2019-09-11 | End: 2019-09-11 | Stop reason: HOSPADM

## 2019-09-11 RX ORDER — HYDROMORPHONE HYDROCHLORIDE 2 MG/1
2 TABLET ORAL
Status: DISCONTINUED | OUTPATIENT
Start: 2019-09-11 | End: 2019-09-13 | Stop reason: HOSPADM

## 2019-09-11 RX ORDER — MIDAZOLAM HYDROCHLORIDE 1 MG/ML
INJECTION, SOLUTION INTRAMUSCULAR; INTRAVENOUS AS NEEDED
Status: DISCONTINUED | OUTPATIENT
Start: 2019-09-11 | End: 2019-09-11 | Stop reason: HOSPADM

## 2019-09-11 RX ORDER — PROPOFOL 10 MG/ML
INJECTION, EMULSION INTRAVENOUS AS NEEDED
Status: DISCONTINUED | OUTPATIENT
Start: 2019-09-11 | End: 2019-09-11 | Stop reason: HOSPADM

## 2019-09-11 RX ORDER — ASPIRIN 81 MG/1
81 TABLET ORAL EVERY 12 HOURS
Status: DISCONTINUED | OUTPATIENT
Start: 2019-09-11 | End: 2019-09-13 | Stop reason: HOSPADM

## 2019-09-11 RX ORDER — FENTANYL CITRATE 50 UG/ML
100 INJECTION, SOLUTION INTRAMUSCULAR; INTRAVENOUS AS NEEDED
Status: DISCONTINUED | OUTPATIENT
Start: 2019-09-11 | End: 2019-09-11 | Stop reason: HOSPADM

## 2019-09-11 RX ORDER — OXYCODONE HYDROCHLORIDE 5 MG/1
10 TABLET ORAL
Status: DISCONTINUED | OUTPATIENT
Start: 2019-09-11 | End: 2019-09-11 | Stop reason: HOSPADM

## 2019-09-11 RX ORDER — ONDANSETRON 2 MG/ML
4 INJECTION INTRAMUSCULAR; INTRAVENOUS ONCE
Status: DISCONTINUED | OUTPATIENT
Start: 2019-09-11 | End: 2019-09-11 | Stop reason: HOSPADM

## 2019-09-11 RX ORDER — NEOMYCIN AND POLYMYXIN B SULFATES 40; 200000 MG/ML; [USP'U]/ML
SOLUTION IRRIGATION AS NEEDED
Status: DISCONTINUED | OUTPATIENT
Start: 2019-09-11 | End: 2019-09-11 | Stop reason: HOSPADM

## 2019-09-11 RX ORDER — ACETAMINOPHEN 500 MG
1000 TABLET ORAL EVERY 6 HOURS
Status: DISCONTINUED | OUTPATIENT
Start: 2019-09-12 | End: 2019-09-13 | Stop reason: HOSPADM

## 2019-09-11 RX ORDER — ACETAMINOPHEN 500 MG
500 TABLET ORAL ONCE
Status: DISCONTINUED | OUTPATIENT
Start: 2019-09-11 | End: 2019-09-11 | Stop reason: HOSPADM

## 2019-09-11 RX ORDER — PROPOFOL 10 MG/ML
INJECTION, EMULSION INTRAVENOUS
Status: DISCONTINUED | OUTPATIENT
Start: 2019-09-11 | End: 2019-09-11 | Stop reason: HOSPADM

## 2019-09-11 RX ORDER — SODIUM CHLORIDE, SODIUM LACTATE, POTASSIUM CHLORIDE, CALCIUM CHLORIDE 600; 310; 30; 20 MG/100ML; MG/100ML; MG/100ML; MG/100ML
1000 INJECTION, SOLUTION INTRAVENOUS CONTINUOUS
Status: DISCONTINUED | OUTPATIENT
Start: 2019-09-11 | End: 2019-09-11 | Stop reason: HOSPADM

## 2019-09-11 RX ORDER — SODIUM CHLORIDE, SODIUM LACTATE, POTASSIUM CHLORIDE, CALCIUM CHLORIDE 600; 310; 30; 20 MG/100ML; MG/100ML; MG/100ML; MG/100ML
75 INJECTION, SOLUTION INTRAVENOUS CONTINUOUS
Status: DISCONTINUED | OUTPATIENT
Start: 2019-09-11 | End: 2019-09-11 | Stop reason: HOSPADM

## 2019-09-11 RX ORDER — ROPIVACAINE HYDROCHLORIDE 2 MG/ML
INJECTION, SOLUTION EPIDURAL; INFILTRATION; PERINEURAL
Status: COMPLETED | OUTPATIENT
Start: 2019-09-11 | End: 2019-09-11

## 2019-09-11 RX ORDER — TRANEXAMIC ACID 100 MG/ML
INJECTION, SOLUTION INTRAVENOUS AS NEEDED
Status: DISCONTINUED | OUTPATIENT
Start: 2019-09-11 | End: 2019-09-11 | Stop reason: HOSPADM

## 2019-09-11 RX ORDER — NALOXONE HYDROCHLORIDE 0.4 MG/ML
.2-.4 INJECTION, SOLUTION INTRAMUSCULAR; INTRAVENOUS; SUBCUTANEOUS
Status: DISCONTINUED | OUTPATIENT
Start: 2019-09-11 | End: 2019-09-13 | Stop reason: HOSPADM

## 2019-09-11 RX ORDER — EPHEDRINE SULFATE 50 MG/ML
INJECTION, SOLUTION INTRAVENOUS AS NEEDED
Status: DISCONTINUED | OUTPATIENT
Start: 2019-09-11 | End: 2019-09-11 | Stop reason: HOSPADM

## 2019-09-11 RX ORDER — CEFAZOLIN SODIUM 1 G/3ML
INJECTION, POWDER, FOR SOLUTION INTRAMUSCULAR; INTRAVENOUS AS NEEDED
Status: DISCONTINUED | OUTPATIENT
Start: 2019-09-11 | End: 2019-09-11 | Stop reason: HOSPADM

## 2019-09-11 RX ORDER — DIPHENHYDRAMINE HCL 25 MG
25 CAPSULE ORAL
Status: DISCONTINUED | OUTPATIENT
Start: 2019-09-11 | End: 2019-09-13 | Stop reason: HOSPADM

## 2019-09-11 RX ORDER — TAMSULOSIN HYDROCHLORIDE 0.4 MG/1
0.4 CAPSULE ORAL DAILY
Status: DISCONTINUED | OUTPATIENT
Start: 2019-09-12 | End: 2019-09-13 | Stop reason: HOSPADM

## 2019-09-11 RX ORDER — DEXAMETHASONE SODIUM PHOSPHATE 4 MG/ML
INJECTION, SOLUTION INTRA-ARTICULAR; INTRALESIONAL; INTRAMUSCULAR; INTRAVENOUS; SOFT TISSUE
Status: COMPLETED | OUTPATIENT
Start: 2019-09-11 | End: 2019-09-11

## 2019-09-11 RX ORDER — DIPHENHYDRAMINE HYDROCHLORIDE 50 MG/ML
12.5 INJECTION, SOLUTION INTRAMUSCULAR; INTRAVENOUS ONCE
Status: DISCONTINUED | OUTPATIENT
Start: 2019-09-11 | End: 2019-09-11 | Stop reason: HOSPADM

## 2019-09-11 RX ORDER — CELECOXIB 200 MG/1
200 CAPSULE ORAL EVERY 12 HOURS
Status: DISCONTINUED | OUTPATIENT
Start: 2019-09-11 | End: 2019-09-13 | Stop reason: HOSPADM

## 2019-09-11 RX ORDER — KETOROLAC TROMETHAMINE 30 MG/ML
INJECTION, SOLUTION INTRAMUSCULAR; INTRAVENOUS AS NEEDED
Status: DISCONTINUED | OUTPATIENT
Start: 2019-09-11 | End: 2019-09-11 | Stop reason: HOSPADM

## 2019-09-11 RX ADMIN — DEXAMETHASONE SODIUM PHOSPHATE 10 MG: 4 INJECTION, SOLUTION INTRA-ARTICULAR; INTRALESIONAL; INTRAMUSCULAR; INTRAVENOUS; SOFT TISSUE at 08:44

## 2019-09-11 RX ADMIN — ASPIRIN 81 MG: 81 TABLET, COATED ORAL at 21:10

## 2019-09-11 RX ADMIN — Medication 3 G: at 16:40

## 2019-09-11 RX ADMIN — MIDAZOLAM HYDROCHLORIDE 1 MG: 1 INJECTION, SOLUTION INTRAMUSCULAR; INTRAVENOUS at 08:44

## 2019-09-11 RX ADMIN — MIDAZOLAM HYDROCHLORIDE 1 MG: 1 INJECTION, SOLUTION INTRAMUSCULAR; INTRAVENOUS at 08:47

## 2019-09-11 RX ADMIN — SODIUM CHLORIDE, SODIUM LACTATE, POTASSIUM CHLORIDE, AND CALCIUM CHLORIDE: 600; 310; 30; 20 INJECTION, SOLUTION INTRAVENOUS at 09:19

## 2019-09-11 RX ADMIN — FENTANYL CITRATE 100 MCG: 50 INJECTION INTRAMUSCULAR; INTRAVENOUS at 08:10

## 2019-09-11 RX ADMIN — HYDROMORPHONE HYDROCHLORIDE 2 MG: 2 TABLET ORAL at 16:39

## 2019-09-11 RX ADMIN — PROPOFOL 100 MCG/KG/MIN: 10 INJECTION, EMULSION INTRAVENOUS at 08:51

## 2019-09-11 RX ADMIN — SODIUM CHLORIDE, SODIUM LACTATE, POTASSIUM CHLORIDE, AND CALCIUM CHLORIDE: 600; 310; 30; 20 INJECTION, SOLUTION INTRAVENOUS at 08:36

## 2019-09-11 RX ADMIN — CELECOXIB 200 MG: 200 CAPSULE ORAL at 21:10

## 2019-09-11 RX ADMIN — LIDOCAINE HYDROCHLORIDE 20 MG: 20 INJECTION, SOLUTION EPIDURAL; INFILTRATION; INTRACAUDAL; PERINEURAL at 08:52

## 2019-09-11 RX ADMIN — ONDANSETRON 4 MG: 2 INJECTION INTRAMUSCULAR; INTRAVENOUS at 08:44

## 2019-09-11 RX ADMIN — Medication 1 AMPULE: at 21:00

## 2019-09-11 RX ADMIN — EPHEDRINE SULFATE 10 MG: 50 INJECTION, SOLUTION INTRAVENOUS at 09:29

## 2019-09-11 RX ADMIN — HYDROMORPHONE HYDROCHLORIDE 2 MG: 2 TABLET ORAL at 12:58

## 2019-09-11 RX ADMIN — ROPIVACAINE HYDROCHLORIDE 20 MG: 2 INJECTION, SOLUTION EPIDURAL; INFILTRATION; PERINEURAL at 08:12

## 2019-09-11 RX ADMIN — MIDAZOLAM 2 MG: 1 INJECTION INTRAMUSCULAR; INTRAVENOUS at 08:10

## 2019-09-11 RX ADMIN — Medication 3 AMPULE: at 07:19

## 2019-09-11 RX ADMIN — ACETAMINOPHEN 1000 MG: 10 INJECTION, SOLUTION INTRAVENOUS at 16:28

## 2019-09-11 RX ADMIN — CEFAZOLIN SODIUM 3 G: 1 INJECTION, POWDER, FOR SOLUTION INTRAMUSCULAR; INTRAVENOUS at 08:39

## 2019-09-11 RX ADMIN — HYDROMORPHONE HYDROCHLORIDE 2 MG: 2 TABLET ORAL at 21:10

## 2019-09-11 RX ADMIN — SODIUM CHLORIDE 100 ML/HR: 900 INJECTION, SOLUTION INTRAVENOUS at 16:30

## 2019-09-11 RX ADMIN — TRANEXAMIC ACID 1000 MG: 100 INJECTION, SOLUTION INTRAVENOUS at 08:39

## 2019-09-11 RX ADMIN — DEXAMETHASONE SODIUM PHOSPHATE 4 MG: 4 INJECTION, SOLUTION INTRA-ARTICULAR; INTRALESIONAL; INTRAMUSCULAR; INTRAVENOUS; SOFT TISSUE at 08:12

## 2019-09-11 RX ADMIN — PROPOFOL 150 MG: 10 INJECTION, EMULSION INTRAVENOUS at 09:08

## 2019-09-11 RX ADMIN — SODIUM CHLORIDE, SODIUM LACTATE, POTASSIUM CHLORIDE, AND CALCIUM CHLORIDE 500 ML: 600; 310; 30; 20 INJECTION, SOLUTION INTRAVENOUS at 07:19

## 2019-09-11 RX ADMIN — EPHEDRINE SULFATE 10 MG: 50 INJECTION, SOLUTION INTRAVENOUS at 09:48

## 2019-09-11 NOTE — ANESTHESIA PREPROCEDURE EVALUATION
Relevant Problems   No relevant active problems       Anesthetic History   No history of anesthetic complications            Review of Systems / Medical History  Patient summary reviewed and pertinent labs reviewed    Pulmonary  Within defined limits                 Neuro/Psych   Within defined limits           Cardiovascular            Dysrhythmias (S/p DCCV) : atrial fibrillation      Exercise tolerance: >4 METS  Comments: EF 55% LBBB  Denies recent CP, SOB, Palps, Syncope, Fatigue   GI/Hepatic/Renal  Within defined limits              Endo/Other        Arthritis     Other Findings              Physical Exam    Airway  Mallampati: II  TM Distance: 4 - 6 cm  Neck ROM: normal range of motion   Mouth opening: Normal     Cardiovascular    Rhythm: regular  Rate: normal         Dental  No notable dental hx       Pulmonary  Breath sounds clear to auscultation               Abdominal  GI exam deferred       Other Findings            Anesthetic Plan    ASA: 3  Anesthesia type: spinal and general - backup      Post-op pain plan if not by surgeon: peripheral nerve block single      Anesthetic plan and risks discussed with: Patient

## 2019-09-11 NOTE — PROGRESS NOTES
Care Management Interventions  Mode of Transport at Discharge: Self  Transition of Care Consult (CM Consult): [address]: No  Reason Outside [place]: Out of service area  Discharge Durable Medical Equipment: Yes  Physical Therapy Consult: Yes  Occupational Therapy Consult: Yes  Current Support Network: Lives with Spouse  Confirm Follow Up Transport: Family  Plan discussed with Pt/Family/Caregiver: Yes  Freedom of Choice Offered: Yes  Discharge Location  Discharge Placement: Home with home health  Patient is a 71y.o. year old male admitted for Right TKA . Patient lives with His spouse and plans to return home on discharge. Order received to arrange home health. Patient without preference towards agency. Referral sent to [facility] who covers [facility].. Patient requesting we arrange a walker and raised toilet seat. [facility] prior to [facility]. Will follow until discharge.

## 2019-09-11 NOTE — ROUTINE PROCESS
TRANSFER - IN REPORT:    Verbal report received from jenn rn(name) on Favio Galan 56  being received from ortho(unit) for ordered procedure      Report consisted of patients Situation, Background, Assessment and   Recommendations(SBAR). Information from the following report(s) SBAR was reviewed with the receiving nurse. Opportunity for questions and clarification was provided. Assessment completed upon patients arrival to unit and care assumed.

## 2019-09-11 NOTE — ANESTHESIA PROCEDURE NOTES
Spinal Block    Start time: 9/11/2019 8:43 AM  End time: 9/11/2019 8:49 AM  Performed by: Massiel Estrada MD  Authorized by: Massiel Estrada MD     Pre-procedure:   Indications: at surgeon's request, post-op pain management, procedure for pain and primary anesthetic  Preanesthetic Checklist: patient identified, risks and benefits discussed, anesthesia consent, site marked, patient being monitored and timeout performed    Timeout Time: 08:43          Spinal Block:   Patient Position:  Seated  Prep Region:  Lumbar  Prep: chlorhexidine      Location:  L3-4  Technique:  Single shot        Needle:   Needle Type:  Pencan  Needle Gauge:  25 G  Attempts:  1      Events: CSF confirmed, no blood with aspiration and no paresthesia        Assessment:  Insertion:  Uncomplicated  Patient tolerance:  Patient tolerated the procedure well with no immediate complications  Mepivicaine 2% 3cc

## 2019-09-11 NOTE — PROGRESS NOTES
09/11/19 1340   Oxygen Therapy   O2 Sat (%) 99 %   Pulse via Oximetry 77 beats per minute   O2 Device Room air   O2 Flow Rate (L/min) 0 l/min   Incentive Spirometry Treatment   Actual Volume (ml) 3000 ml   Number of Attempts 1   No shortness of breath or distress noted. BS are clear b/l.    Joint Camp notes reviewed- continuous sat # 03 ordered HS

## 2019-09-11 NOTE — PROGRESS NOTES
TRANSFER - IN REPORT:    Verbal report received from William Carrillo RN on [facility]  being received from PACU for routine post - op      Report consisted of patients Situation, Background, Assessment and   Recommendations(SBAR). Information from the following report(s) SBAR, Intake/Output and MAR was reviewed with the receiving nurse. Opportunity for questions and clarification was provided. Assessment completed upon patients arrival to unit and care assumed. Oriented to room, bed controls, and how to order meals. Barely moving LEs. Aquacel dry and intact to R knee with iceman. SCDs and yellow gripper socks to LEs and instructed not to get up without staff to assist. Daughter at bedside.

## 2019-09-11 NOTE — PROGRESS NOTES
Problem: Self Care Deficits Care Plan (Adult)  Goal: *Acute Goals and Plan of Care (Insert Text)  Description  GOALS:   DISCHARGE GOALS (in preparation for going home/rehab):  3 days  1. Mr. Emely Will will perform one lower body dressing activity with minimal assistance required to demonstrate improved functional mobility and safety. 2.  Mr. Emely Will will perform one lower body bathing activity with minimal assistance required to demonstrate improved functional mobility and safety. 3.  Mr. Emely Will will perform toileting/toilet transfer with contact guard assistance to demonstrate improved functional mobility and safety. 4.  Mr. Emely Will will perform shower transfer with contact guard assistance to demonstrate improved functional mobility and safety. 9/11/2019 1410 by Lonny Portillo OT  Outcome: Progressing Towards Goal    JOINT CAMP OCCUPATIONAL THERAPY TKA: Initial Assessment and PM 9/11/2019  INPATIENT: Hospital Day: 1  [place] / [place] / Product Type: Medicare /      NAME/AGE/GENDER: Carol Willis is a 71 y.o. male   PRIMARY DIAGNOSIS:  Primary osteoarthritis of right knee [M17.11]   Procedure(s) and Anesthesia Type:     * KNEE ARTHROPLASTY TOTAL/ RIGHT - Spinal (Right)  ICD-10: Treatment Diagnosis:    Pain in Right Knee (M25.561)  Stiffness of Right Knee, Not elsewhere classified (R45.476)      ASSESSMENT:     Mr. Emely Will is s/p R TKA and presents with decreased weight bearing on R LE and decreased independence with functional mobility and activities of daily living as compared to baseline level of function and safety. Patient would benefit from skilled Occupational Therapy to maximize independence and safety with self-care task and functional mobility. Pt would also benefit from education on adaptive equipment and safety precautions in preparation for going home.       This section established at most recent assessment   PROBLEM LIST (Impairments causing functional limitations):  Decreased Strength  Decreased ADL/Functional Activities  Decreased Transfer Abilities  Increased Pain  Increased Fatigue  Decreased Flexibility/Joint Mobility  Decreased Knowledge of Precautions   INTERVENTIONS PLANNED: (Benefits and precautions of occupational therapy have been discussed with the patient.)  Activities of daily living training  Adaptive equipment training  Balance training  Clothing management  Donning&doffing training  Theraputic activity     TREATMENT PLAN: Frequency/Duration: Follow patient qd to address above goals. Rehabilitation Potential For Stated Goals: Excellent     RECOMMENDED REHABILITATION/EQUIPMENT: (at time of discharge pending progress): Continue Skilled Therapy and Home Health: Physical Therapy. OCCUPATIONAL PROFILE AND HISTORY:   History of Present Injury/Illness (Reason for Referral): Pt presents this date s/p (R) TKA. Past Medical History/Comorbidities:   Mr. Jill Bautista  has a past medical history of Arthritis, Atrial fibrillation ([facility] [address] (Dx 5/2019), BPH associated with nocturia, Chronic pain, H/O echocardiogram (07/18/2019), LBBB (left bundle branch block), and Status post total knee replacement, right (9/11/2019). Mr. Jill Bautista  has a past surgical history that includes hx colonoscopy (2019); hx hernia repair (Right, 2014); hx knee arthroscopy (Right, 2005); and hx afib ablation.   Social History/Living Environment:   Home Environment: Private residence  # Steps to Enter: 4  One/Two Story Residence: One story  Living Alone: Yes  Support Systems: Child(kinga)  Patient Expects to be Discharged to[de-identified] Private residence  Current DME Used/Available at Home: None  Prior Level of Function/Work/Activity:  independent   Number of Personal Factors/Comorbidities that affect the Plan of Care: Brief history (0):  LOW COMPLEXITY   ASSESSMENT OF OCCUPATIONAL PERFORMANCE[de-identified]   Most Recent Physical Functioning:   Balance  Sitting: Intact  Standing: With support;Pull to stand Gross Assessment: Yes  Gross Assessment  AROM: Within functional limits(BUE)  Strength: Within functional limits(BUE)  Coordination: Within functional limits(BUE)  Tone: Normal  Sensation: Intact                 Vision  Acuity: Within Defined Limits    Mental Status  Neurologic State: Alert  Orientation Level: Oriented X4  Cognition: Follows commands  Perception: Appears intact  Perseveration: No perseveration noted  Safety/Judgement: Fall prevention                Basic ADLs (From Assessment) Complex ADLs (From Assessment)   Basic ADL  Feeding: Setup  Oral Facial Hygiene/Grooming: Setup  Bathing: Moderate assistance  Upper Body Dressing: Setup  Lower Body Dressing: Moderate assistance  Toileting: Contact guard assistance     Grooming/Bathing/Dressing Activities of Daily Living     Cognitive Retraining  Safety/Judgement: Fall prevention                 Functional Transfers  Bathroom Mobility: Contact guard assistance  Toilet Transfer : Contact guard assistance  Shower Transfer: Contact guard assistance     Bed/Mat Mobility  Supine to Sit: Contact guard assistance  Sit to Stand: Contact guard assistance  Stand to Sit: Contact guard assistance  Bed to Chair: Contact guard assistance         Physical Skills Involved:  Balance  Strength  Activity Tolerance Cognitive Skills Affected (resulting in the inability to perform in a timely and safe manner):  none  Psychosocial Skills Affected:  none    Number of elements that affect the Plan of Care: 1-3:  LOW COMPLEXITY   CLINICAL DECISION MAKIN [facility] [address] [id number] AM-PAC 6 Clicks   Daily Activity Inpatient Short Form  How much help from another person does the patient currently need. .. Total A Lot A Little None   1. Putting on and taking off regular lower body clothing? ? 1   ? 2   ? 3   ? 4   2. Bathing (including washing, rinsing, drying)? ? 1   ? 2   ? 3   ? 4   3. Toileting, which includes using toilet, bedpan or urinal?   ? 1   ? 2   ? 3   ? 4   4. Putting on and taking off regular upper body clothing? ? 1   ? 2   ? 3   ? 4   5. Taking care of personal grooming such as brushing teeth? ? 1   ? 2   ? 3   ? 4   6. Eating meals? ? 1   ? 2   ? 3   ? 4   © 2007, Trustees of [facility] [employer], under license to [employer] All rights reserved     Score:  Initial: 19 Most Recent: X (Date: -- )    Interpretation of Tool:  Represents activities that are increasingly more difficult (i.e. Bed mobility, Transfers, Gait). Medical Necessity:     Patient is expected to demonstrate progress in strength, balance, coordination and functional technique   to increase independence with self care and functional mobility   . Reason for Services/Other Comments:  Patient continues to require skilled intervention due to decrease self care and mobility   . Use of outcome tool(s) and clinical judgement create a POC that gives a: LOW COMPLEXITY            TREATMENT:   (In addition to Assessment/Re-Assessment sessions the following treatments were rendered)     Pre-treatment Symptoms/Complaints:  tolerated sitting in recliner  Pain: Initial:   Pain Intensity 1: 0  Post Session: 0     Assessment/Reassessment only, no treatment provided today    Treatment/Session Assessment:     Response to Treatment:  tolerated well    Education:  ? Home Exercises  ? Fall Precautions  ? Hip Precautions ? Going Home Video  ? Knee/Hip Prosthesis Review  ? Walker Management/Safety ? Adaptive Equipment as Needed       Interdisciplinary Collaboration:   Physical Therapist  Occupational Therapist  Registered Nurse    After treatment position/precautions:   Up in chair  Bed/Chair-wheels locked  Caregiver at bedside  Call light within reach  RN notified  Les reclined      Compliance with Program/Exercises: Compliant all of the time. Recommendations/Intent for next treatment session:  Treatment next visit will focus on increasing Mr. Quinones's independence with bed mobility, transfers, self care, functional mobility, modalities for pain, and patient education.       Total Treatment Duration:  OT Patient Time In/Time Out  Time In: 1245  Time Out: [address] Mitchel Mathews

## 2019-09-11 NOTE — CONSULTS
Hospitalist Note     Admit Date:  2019  6:12 AM   Name:  Ca Rome   Age:  71 y.o.  :  1955   MRN:  [id number]   PCP:  Tracie Gonzalez MD  Treatment Team: Attending Provider: Lubna Ritter MD; Consulting Provider: Rocío Wiley MD; Consulting Provider: Norma Hall MD; Consulting Provider: Lefty Ellis MD; Care Manager: Omari Clements      Subjective:   Hospitalists consulted for assistance with medical management. Chart reviewed. No acute needs identified from our standpoint. Past Medical History:   Diagnosis Date    Arthritis     Atrial fibrillation ([address] Dx 2019    BRIT and DCCV performed (19) and he has maintained NSR since. EKG dated 19 shows NRS    BPH associated with nocturia     managed with medication    Chronic pain     lower back and legs    H/O echocardiogram 2019    EF 55-65%    LBBB (left bundle branch block)     Stress (19) \"no ischemia,\" Echo (19) EF 55-65%    Status post total knee replacement, right 2019      Past Surgical History:   Procedure Laterality Date    HX AFIB ABLATION      cardioversion     HX COLONOSCOPY      5 polyps removed    HX HERNIA REPAIR Right     [facility] Dr. Maryanne Cabrera    HX KNEE ARTHROSCOPY Right     States \"either 2005 or \"      No Known Allergies   Social History     Tobacco Use    Smoking status: Former Smoker     Last attempt to quit: 2016     Years since quittin.7    Smokeless tobacco: Never Used   Substance Use Topics    Alcohol use: Not on file     Comment: occasional      Family History   Problem Relation Age of Onset    Arrhythmia Mother     Cancer Father       Immunization History   Administered Date(s) Administered    Influenza Vaccine (Tri) Adjuvanted 2019    Tdap 2013     PTA Medications:  Prior to Admission Medications   Prescriptions Last Dose Informant Patient Reported? Taking?    HYDROcodone-acetaminophen (NORCO) 7.5-325 mg per tablet 8/11/2019 at Unknown time  Yes Yes   Sig: Take 1 Tab by mouth every six (6) hours as needed for Pain. aspirin delayed-release 81 mg tablet 9/11/2019 at Unknown time  Yes Yes   Sig: Take 81 mg by mouth daily. celecoxib (CELEBREX) 200 mg capsule Not Taking at Unknown time  Yes No   Sig: Take 200 mg by mouth daily. ibuprofen (ADVIL) 200 mg tablet Not Taking at Unknown time  Yes No   Sig: Take 600 mg by mouth every six (6) hours as needed for Pain.   tamsulosin (FLOMAX) 0.4 mg capsule Not Taking at Unknown time  Yes No   Sig: Take 0.4 mg by mouth daily. Facility-Administered Medications: None       Objective:     Patient Vitals for the past 24 hrs:   Temp Pulse Resp BP SpO2   09/11/19 1141 97.6 °F (36.4 °C) 63 16 105/69 96 %   09/11/19 1110  67 16 113/68 95 %   09/11/19 1100  68 16 115/71 95 %   09/11/19 1045  68 16 112/71 96 %   09/11/19 1040  73 16 110/65 95 %   09/11/19 1035  70 16 118/71 93 %   09/11/19 1030 98.3 °F (36.8 °C) 80 16 112/67 94 %   09/11/19 0823  (!) 58 16 111/71 98 %   09/11/19 0818  (!) 58 14 112/68 98 %   09/11/19 0813  (!) 57 14 111/65 98 %   09/11/19 0810  (!) 53 16 123/78 99 %   09/11/19 0742  (!) 59 18 136/79 97 %   09/11/19 0642 98 °F (36.7 °C) 67 20 110/76 94 %     Oxygen Therapy  O2 Sat (%): 96 % (09/11/19 1141)  O2 Device: Nasal cannula (09/11/19 1145)  O2 Flow Rate (L/min): 3 l/min (09/11/19 1110)    Intake/Output Summary (Last 24 hours) at 9/11/2019 1322  Last data filed at 9/11/2019 1110  Gross per 24 hour   Intake 1300 ml   Output 450 ml   Net 850 ml       *Note that automatically entered I/Os may not be accurate; dependent on patient compliance with collection and accurate  by assistants.     Data Review:   Recent Results (from the past 24 hour(s))   GLUCOSE, POC    Collection Time: 09/11/19  7:25 AM   Result Value Ref Range    Glucose (POC) 90 65 - 100 mg/dL       All Micro Results     None          Current Meds:  Current Facility-Administered Medications   Medication Dose Route Frequency    ceFAZolin (ANCEF) 3 g/30 mL in sterile water IV syringe  3 g IntraVENous ONCE    [START ON 9/12/2019] tamsulosin (FLOMAX) capsule 0.4 mg  0.4 mg Oral DAILY    alcohol 62% (NOZIN) nasal  1 Ampule  1 Ampule Topical Q12H    0.9% sodium chloride infusion  100 mL/hr IntraVENous CONTINUOUS    sodium chloride (NS) flush 5-40 mL  5-40 mL IntraVENous Q8H    sodium chloride (NS) flush 5-40 mL  5-40 mL IntraVENous PRN    acetaminophen (OFIRMEV) infusion 1,000 mg  1,000 mg IntraVENous ONCE    [START ON 9/12/2019] acetaminophen (TYLENOL) tablet 1,000 mg  1,000 mg Oral Q6H    celecoxib (CELEBREX) capsule 200 mg  200 mg Oral Q12H    HYDROmorphone (DILAUDID) tablet 2 mg  2 mg Oral Q4H PRN    HYDROmorphone (PF) (DILAUDID) injection 1 mg  1 mg IntraVENous Q3H PRN    naloxone (NARCAN) injection 0.2-0.4 mg  0.2-0.4 mg IntraVENous Q10MIN PRN    [START ON 9/12/2019] dexamethasone (DECADRON) injection 10 mg  10 mg IntraVENous ONCE    ondansetron (ZOFRAN) injection 4 mg  4 mg IntraVENous Q4H PRN    diphenhydrAMINE (BENADRYL) capsule 25 mg  25 mg Oral Q4H PRN    [START ON 9/12/2019] senna-docusate (PERICOLACE) 8.6-50 mg per tablet 2 Tab  2 Tab Oral DAILY    aspirin delayed-release tablet 81 mg  81 mg Oral Q12H    ceFAZolin (ANCEF) 3 g/30 mL in sterile water IV syringe  3 g IntraVENous Q8H       Other Studies:  No results found. Assessment and Plan:     Hospital Problems as of 9/11/2019 Date Reviewed: 9/11/2019          Codes Class Noted - Resolved POA    Osteoarthritis of right knee ICD-10-CM: M17.11  ICD-9-CM: 715.96  9/11/2019 - Present Yes        * (Principal) Status post total knee replacement, right ICD-10-CM: Y80.444  ICD-9-CM: V43.65  9/11/2019 - Present No              PLAN:  · Agree with current treatment plan as per primary team  · Nursing may call us with any acute issues that arise  · No charge billed to the patient for this. Thank you    Signed:  Remy Cruz MD

## 2019-09-11 NOTE — ANESTHESIA POSTPROCEDURE EVALUATION
Procedure(s):  KNEE ARTHROPLASTY TOTAL/ RIGHT.     spinal, general - backup    Anesthesia Post Evaluation      Multimodal analgesia: multimodal analgesia used between 6 hours prior to anesthesia start to PACU discharge  Patient location during evaluation: bedside  Patient participation: complete - patient participated  Level of consciousness: responsive to verbal stimuli  Pain management: adequate  Airway patency: patent  Anesthetic complications: no  Cardiovascular status: hemodynamically stable  Respiratory status: spontaneous ventilation  Hydration status: stable        Vitals Value Taken Time   /71 9/11/2019 11:00 AM   Temp 36.8 °C (98.3 °F) 9/11/2019 10:30 AM   Pulse 68 9/11/2019 11:00 AM   Resp 16 9/11/2019 11:00 AM   SpO2 95 % 9/11/2019 11:00 AM

## 2019-09-11 NOTE — PERIOP NOTES
Betadine lavage:  17.5cc of betadine lot #  [id number], exp. Date  03/21,  in 500cc of . 9NS Lot # [id number] , exp.  Date : 1/1/22

## 2019-09-11 NOTE — PERIOP NOTES
TRANSFER - OUT REPORT:    Verbal report given to [facility], RN on Kit Vaughan  being transferred to [facility] for routine post - op       Report consisted of patients Situation, Background, Assessment and   Recommendations(SBAR). Information from the following report(s) OR Summary, Procedure Summary, Intake/Output and MAR was reviewed with the receiving nurse. Opportunity for questions and clarification was provided.       Patient transported with:   O2 @ 3 liters

## 2019-09-11 NOTE — PERIOP NOTES
TRANSFER - OUT REPORT:    Verbal report given to [address] Krissy Roth RN(name) on Favio Nguyễn  being transferred to PRE-OP(unit) for routine progression of care       Report consisted of patients Situation, Background, Assessment and   Recommendations(SBAR). Information from the following report(s) SBAR, MAR and Recent Results was reviewed with the receiving nurse. Lines:   Peripheral IV 09/11/19 Left Wrist (Active)   Site Assessment Clean, dry, & intact 9/11/2019  7:09 AM   Phlebitis Assessment 0 9/11/2019  7:09 AM   Infiltration Assessment 0 9/11/2019  7:09 AM   Dressing Status Clean, dry, & intact 9/11/2019  7:09 AM   Dressing Type Tape;Transparent 9/11/2019  7:09 AM   Hub Color/Line Status Green; Infusing 9/11/2019  7:09 AM   Action Taken Blood drawn 9/11/2019  7:09 AM        Opportunity for questions and clarification was provided.       Patient transported with:   [employer]

## 2019-09-11 NOTE — OP NOTES
[facility]  Cementless Total Knee Arthroplasty: Posterior Cruciate Retaining     Patient:Kevon Beth   : 1955  Medical Record CUXMPP:[id number]  Pre-operative Diagnosis:  Primary osteoarthritis of right knee [M17.11]  Post-operative Diagnosis: same  Location: [address] [place] [address]  Surgeon: Rosalba Moses MD   Assistant: Lucio Hernández PA-C  Anesthesia: Spinal and FNB    Indications: Patient has end stage arthritis. They have tried and failed conservative management. Procedure:Procedure(s) (LRB):  KNEE ARTHROPLASTY TOTAL/ RIGHT (Right)   CPT Code: 35787  The complexity of the total joint surgery requires the use of a first assistant for positioning, retraction and expertise in closure. Tourniquet Time: 0 minutes  EBL: 250 cc  Findings: severe degenerative arthritis, patellar osteophytes, posterior femoral osteophytes   BMI: Body mass index is 32.25 kg/m². Tono Steel was brought to the operating room and positioned on the operating table. He was anesthestized with anesthesia. A lal catheter was placed preoperatively and IV antibiotics was administered. Prior to the incision being made a timeout was called identifying the patient, procedure ,operative side and surgeon The operative leg was prepped and draped in the usual sterile manner. An anterior longitudinal incision was accomplished just medial to the tibial tubercle and extending approximal 6 centimeters proximal to the superior pole of the patella. A medial parapatellar capsular incision was performed. The medial capsular flap was elevated around to the insertion of the semimembranous tendon. The patella was everted and the knee flexed and externally rotated. The medial and external menisci were excised. The lateral half of the fat pad excised and the patella femoral ligament was released. The anterior cruciate ligament was resect and the posterior cruciate ligament was retained.   Using extramedullary instrumentation, the tibial cut was accomplished with appropriate posterior slope. The distal femur was addressed. A drill hole was made above the intracondylar notch. Using appropriate intramedullary instrumentation,a five degree valgus distal cut was accomplished. The femur was sized. The anterior and posterior cuts were then made about the distal femur. The osteophytes were removed from the tibial and femoral surfaces. The flexion and extension gaps were assessed with the appropriate spacer blocks. Additional surgical procedures included: none. The flexion and extension gaps were deemed appropriately balanced. The appropriate cutting blocks were then utilized to perform the anterior, posterior and chamfer cuts, with appropriate lateral translation accomplished for the patellofemoral groove. Approximately 9 mm of bone was removed from the high side of the tibia. The tibia was sized. .  The tibial base plate was pinned into place with the appropriate external rotation and stem site prepared. A preliminary range of motion was accomplished. The  Patient was found to obtain full extension as well as appropriate flexion. The patient's ligaments were stable in flexion and extension to medial and lateral stressing and the alignment was through the appropriate mechanical axis. The patella was then everted. The bone was resect to accommodate the three peg patella button. A trial reduction revealed appropriate tracking through the patellofemoral groove with no lateral retinacular release being accomplished. All trial components were removed. The real implants were opened: Sizes listed below. The knee was irrigated. There were no femoral deficiencies. There were no tibial deficiencies. No augmentation was utilized. The permanent cementless Tibial and Femoral components were impacted into place. The cementless  patella component was then pressed in place.     [facility] Oanh Scott knee was placed through range of motion and noted to be stable as mentioned above with the trail components. The wound was dry, therefore no drain was used. The operative knee was injected with 60 cc of Naropin, 10 cc's of morphine and 1 cc of 30 mg of Toradol. The knee was then soaked with a diluted betadine solution for approximately 3 min. This was then thoroughly irrigated. The capsular layer was closed using a #1 PDS suture. Then, 1 gram (100 mg/ml) of Transexamic Acid was injected into the joint space. The subcutaneous layers were closed using 2-0 Stratafix. Finally the skin was closed using 3-0 Vicryl and Staples which were applied in occlusive fashion and sterile bandage applied. An Iceman cryo pad was applied on the operative leg. Sponge count and needle counts were correct. Favio Nguyễn left the operating room     Implants:   Implant Name Type Inv.  Item Serial No.  Lot No. LRB No. Used   COMPNT FEM CR TRIATHLN 8 R PA --  - [id number]  COMPNT FEM CR TRIATHLN 8 R PA --  [id number] [employer] HOW [id number] Right 1   BASEPLT TIB PC TRITNM SZ 8 -- TRIATHLON - [id number]  BASEPLT TIB PC TRITNM SZ 8 -- TRIATHLON [id number] [employer] [facility] [id number] Right 1   PAT ASYM MTL-BK 11MM SZ A40 -- TRIATHLON - [id number]  PAT ASYM MTL-BK 11MM SZ A40 -- TRIATHLON [id number] [employer] [facility] [id number] Right 1   Tibial Bearing insert-cr   [id number]  [id number] Right 1         Signed By: Kenyon Valenzuela MD   9/11/2019,  10:07 AM

## 2019-09-11 NOTE — ANESTHESIA PROCEDURE NOTES
Peripheral Block    Start time: 9/11/2019 8:10 AM  End time: 9/11/2019 8:14 AM  Performed by: Blanka Brown MD  Authorized by: Blanka Brown MD       Pre-procedure: Indications: at surgeon's request, post-op pain management and procedure for pain    Preanesthetic Checklist: patient identified, risks and benefits discussed, site marked, timeout performed, anesthesia consent given and patient being monitored    Timeout Time: 08:10          Block Type:   Block Type:   Adductor canal  Laterality:  Right  Monitoring:  Standard ASA monitoring, responsive to questions, oxygen, continuous pulse ox, frequent vital sign checks and heart rate  Injection Technique:  Single shot  Procedures: ultrasound guided    Patient Position: supine  Prep: chlorhexidine    Location:  Mid thigh  Needle Type:  Stimuplex  Needle Gauge:  22 G  Needle Localization:  Ultrasound guidance    Assessment:  Number of attempts:  1  Injection Assessment:  Incremental injection every 5 mL, no paresthesia, ultrasound image on chart, no intravascular symptoms, negative aspiration for blood and local visualized surrounding nerve on ultrasound  Patient tolerance:  Patient tolerated the procedure well with no immediate complications

## 2019-09-11 NOTE — PROGRESS NOTES
Medicated for pain with dilaudid po again. Sitting up in bed. Good movement and sensation to LEs. Voiding. Daughter at bedside.

## 2019-09-11 NOTE — PERIOP NOTES
Teach back method used in review of Hibiclens usage preop/postop, TB screening, pain management goals, falls precautions and use of Nozin for prevention of staph infections. Incentive spirometer reviewed and pt reached  TOTAL TIDAL 2250 ML OBSERVED  in preop holding.

## 2019-09-11 NOTE — PROGRESS NOTES
[facility]  Face to Face Encounter    Patients Name: Delfin Shin    YOB: 1955    Ordering Physician: Vibha Esposito    Primary Diagnosis: Primary osteoarthritis of right knee [M17.11]  Osteoarthritis [M19.90]  S/p right TKA    Date of Face to Face:   9/11/2019                                  Face to Face Encounter findings are related to primary reason for home care:   yes. 1. I certify that the patient needs intermittent care as follows: physical therapy: gait/stair training    2. I certify that this patient is homebound, that is: 1) patient requires the use of a walker device, special transportation, or assistance of another to leave the home; or 2) patient's condition makes leaving the home medically contraindicated; and 3) patient has a normal inability to leave the home and leaving the home requires considerable and taxing effort. Patient may leave the home for infrequent and short duration for medical reasons, and occasional absences for non-medical reasons. Homebound status is due to the following functional limitations: Patient's ambulation limited secondary to severe pain and requires the use of an assistive device and the assistance of a caregiver for safe completion. Patient with strength and ROM deficits limiting ambulation endurance requiring the use of an assistive device and the assistance of a caregiver. Patient deemed temporarily homebound secondary to increased risk for infection when leaving home and going out into the community. 3. I certify that this patient is under my care and that I, or a nurse practitioner or  [id number], or clinical nurse specialist, or certified nurse midwife, working with me, had a Face-to-Face Encounter that meets the physician Face-to-Face Encounter requirements.   The following are the clinical findings from the [address] [place] [address] encounter that support the need for skilled services and is a summary of the encounter: see [facility] chart        Mattheworberto Lund, BSGERDA  9/11/2019      THE FOLLOWING TO BE COMPLETED BY THE COMMUNITY PHYSICIAN:    I concur with the findings described above from the F2F encounter that this patient is homebound and in need of a skilled service.     Certifying Physician: _____________________________________      Printed Certifying Physician Name: _____________________________________    Date: _________________

## 2019-09-11 NOTE — H&P
The patient has end stage arthritis of the right knee. The patient was see and examined and there are no changes to the patient's orthopedic condition. They have tried conservative treatment for this condition; including antiinflammatories and lifestyle modifications and have failed. The necessity for the joint replacement is still present, and the H&P from the office is still current.  The patient will be admitted today for Procedure(s) (LRB):  KNEE ARTHROPLASTY TOTAL/ RIGHT/ MAE/ FNB (Right)

## 2019-09-11 NOTE — PROGRESS NOTES
Problem: Mobility Impaired (Adult and Pediatric)  Goal: *Acute Goals and Plan of Care (Insert Text)  Description  GOALS (1-4 days):  (1.)Mr. Lisa Veras will move from supine to sit and sit to supine  in bed with INDEPENDENT. (2.)Mr. Lisa Veras will transfer from bed to chair and chair to bed with SUPERVISION using the least restrictive device. (3.)Mr. Lisa Veras will ambulate with SUPERVISION for 200 feet with the least restrictive device. (4.)Mr. Lisa Veras will ambulate up/down 5 steps with No railing with MINIMAL ASSIST with no device. (5.)Mr. Lisa Veras will increase right knee ROM to 5°-80°.  ________________________________________________________________________________________________   Outcome: Progressing Towards Goal     PHYSICAL THERAPY JOINT CAMP TKA: Initial Assessment, Treatment Day: Day of Assessment and PM 9/11/2019  INPATIENT: Hospital Day: 1  [place] / Product Type: Medicare /      NAME/AGE/GENDER: Trace Grandchild is a 71 y.o. male   PRIMARY DIAGNOSIS:  Primary osteoarthritis of right knee [M17.11]   Procedure(s) and Anesthesia Type:     * KNEE ARTHROPLASTY TOTAL/ RIGHT - Spinal (Right)  ICD-10: Treatment Diagnosis:    · Pain in Right Knee (M25.561)  · Stiffness of Right Knee, Not elsewhere classified (M25.661)  · Difficulty in walking, Not elsewhere classified (R26.2)      ASSESSMENT:     Mr. Lisa Veras presents with impaired strength & mobility s/p right TKA. Pt also had decreased stability during out of bed activity. This pt will benefit from follow up therapy to help restore safe function prior to returning home with caregiver. This section established at most recent assessment   PROBLEM LIST (Impairments causing functional limitations):  1. Decreased Strength  2. Decreased ADL/Functional Activities  3. Decreased Transfer Abilities  4. Decreased Ambulation Ability/Technique  5. Decreased Balance  6. Increased Pain  7. Decreased Activity Tolerance  8.  Decreased Flexibility/Joint Mobility  9. Decreased [place] with Home Exercise Program   INTERVENTIONS PLANNED: (Benefits and precautions of physical therapy have been discussed with the patient.)  1. bed mobility  2. gait training  3. home exercise program (HEP)  4. Range of Motion: active/assisted/passive  5. Therapeutic Activities  6. therapeutic exercise/strengthening  7. transfer training  8. Group Therapy     TREATMENT PLAN: Frequency/Duration: Follow patient BID for duration of hospital stay to address above goals. Rehabilitation Potential For Stated Goals: Good     RECOMMENDED REHABILITATION/EQUIPMENT: (at time of discharge pending progress): Continue Skilled Therapy and Home Health: Physical Therapy. HISTORY:   History of Present Injury/Illness (Reason for Referral):  Right TKA  Past Medical History/Comorbidities:   Mr. Tigist Mcginnis  has a past medical history of Arthritis, Atrial fibrillation ([facility] [address] (Dx 5/2019), BPH associated with nocturia, Chronic pain, H/O echocardiogram (07/18/2019), LBBB (left bundle branch block), and Status post total knee replacement, right (9/11/2019). Mr. Tigist Mcginnis  has a past surgical history that includes hx colonoscopy (2019); hx hernia repair (Right, 2014); hx knee arthroscopy (Right, 2005); and hx afib ablation. Social History/Living Environment:   Home Environment: Private residence  # Steps to Enter: 5  Rails to Enter: No  One/Two Story Residence: One story  Living Alone: Yes(family to assist)  Support Systems: Child(kinga)  Patient Expects to be Discharged to[de-identified] Private residence  Current DME Used/Available at Home: None  Prior Level of Function/Work/Activity:  Pt was independent without an assistive device prior to this admission   Number of Personal Factors/Comorbidities that affect the Plan of Care: 3+: HIGH COMPLEXITY   EXAMINATION:   Most Recent Physical Functioning:   Gross Assessment: Yes  Gross Assessment  AROM: Within functional limits(left LE)  Strength:  Within functional limits(left LE)  Coordination: Within functional limits(left LE)  Tone: Normal  Sensation: (impaired due to block still active)         RLE PROM  R Knee Flexion: 60(~post op)  R Knee Extension: -10(~post op)           Bed Mobility  Supine to Sit: Contact guard assistance  Sit to Supine: Contact guard assistance  Scooting: Contact guard assistance    Transfers  Sit to Stand: Contact guard assistance  Stand to Sit: Contact guard assistance  Bed to Chair: Contact guard assistance(with walker)    Balance  Sitting: Intact; Without support  Standing: Impaired; With support(walker)              Weight Bearing Status  Right Side Weight Bearing: As tolerated  Distance (ft): 100 Feet (ft)  Ambulation - Level of Assistance: Contact guard assistance  Assistive Device: Walker, rolling  Speed/Selina: Delayed  Step Length: Left shortened  Stance: Right decreased  Gait Abnormalities: Antalgic;Decreased step clearance        Braces/Orthotics: none    Right Knee Cold  Type: Cryocuff      Body Structures Involved:  1. Joints  2. Muscles Body Functions Affected:  1. Sensory/Pain  2. Movement Related Activities and Participation Affected:  1. General Tasks and Demands  2. Mobility   Number of elements that affect the Plan of Care: 4+: HIGH COMPLEXITY   CLINICAL PRESENTATION:   Presentation: Stable and uncomplicated: LOW COMPLEXITY   CLINICAL DECISION MAKIN [facility] [address] [id number] AM-PAC 6 Clicks   Basic Mobility Inpatient Short Form  How much difficulty does the patient currently have. .. Unable A Lot A Little None   1. Turning over in bed (including adjusting bedclothes, sheets and blankets)? ? 1   ? 2   ? 3   ? 4   2. Sitting down on and standing up from a chair with arms ( e.g., wheelchair, bedside commode, etc.)   ? 1   ? 2   ? 3   ? 4   3. Moving from lying on back to sitting on the side of the bed?   ? 1   ? 2   ? 3   ? 4   How much help from another person does the patient currently need. ..  Total A Lot A Little None   4. Moving to and from a bed to a chair (including a wheelchair)? ? 1   ? 2   ? 3   ? 4   5. Need to walk in hospital room? ? 1   ? 2   ? 3   ? 4   6. Climbing 3-5 steps with a railing? ? 1   ? 2   ? 3   ? 4   © 2007, Trustees of [facility] [employer], under license to [employer]. All rights reserved     Score:  Initial: 16 Most Recent: X (Date: -- )    Interpretation of Tool:  Represents activities that are increasingly more difficult (i.e. Bed mobility, Transfers, Gait). Medical Necessity:     · Patient is expected to demonstrate progress in strength, range of motion, balance, coordination and functional technique  ·  to decrease assistance required with bed mobility, transfers & gait   · .  Reason for Services/Other Comments:  · Patient continues to require skilled intervention due to pt not independent with functional mobility   · . Use of outcome tool(s) and clinical judgement create a POC that gives a: Clear prediction of patient's progress: LOW COMPLEXITY            TREATMENT:   (In addition to Assessment/Re-Assessment sessions the following treatments were rendered)     Pre-treatment Symptoms/Complaints:  none  Pain Initial: numeric scale  Pain Intensity 1: 3  Pain Location 1: Knee  Pain Orientation 1: Right  Pain Intervention(s) 1: Ambulation/Increased Activity, Cold pack  Post Session:  3/10     Therapeutic Exercise: (15 Minutes):  Exercises per grid below to improve mobility, strength, balance, coordination and dynamic movement of leg - right to improve functional endurance . Required minimal verbal cues to promote proper body alignment and promote proper body mechanics. Progressed range and repetitions as indicated. Assessment/ 15 min      Date:  9/11 Date:   Date:     ACTIVITY/EXERCISE AM PM AM PM AM PM   GROUP THERAPY  ?  ?  ?  ?  ?  ?    Ankle Pumps  10       Quad Sets  10       Gluteal Sets  10       Hip ABd/ADduction  10       Straight Leg Raises  10       Knee Slides  10 Short Arc Quads  10       Long Arc Quads         Chair Slides                  B = bilateral; AA = active assistive; A = active; P = passive      Treatment/Session Assessment:     Response to Treatment:  tolerated well    Education:  ? Home Exercises  ? Fall Precautions  ? ? D/C Instruction Review  ? Knee Prosthesis Review  ? Walker Management/Safety ? Adaptive Equipment as Needed       Interdisciplinary Collaboration:   o Registered Nurse    After treatment position/precautions:   o Up in chair  o Bed/Chair-wheels locked  o Caregiver at bedside  o Call light within reach  o RN notified  o Family at bedside    Compliance with Program/Exercises: Will assess as treatment progresses. Recommendations/Intent for next treatment session:  Treatment next visit will focus on increasing Mr. Quinones's independence with bed mobility, transfers, gait training, strength/ROM exercises, modalities for pain, and patient education.       Total Treatment Duration:  PT Patient Time In/Time Out  Time In: 1305  Time Out: 0970 [facility] Mickey Maldonado PT

## 2019-09-12 LAB
ANION GAP SERPL CALC-SCNC: 6 MMOL/L (ref 7–16)
BUN SERPL-MCNC: 15 MG/DL (ref 8–23)
CALCIUM SERPL-MCNC: 8.4 MG/DL (ref 8.3–10.4)
CHLORIDE SERPL-SCNC: 110 MMOL/L (ref 98–107)
CO2 SERPL-SCNC: 24 MMOL/L (ref 21–32)
CREAT SERPL-MCNC: 0.87 MG/DL (ref 0.8–1.5)
GLUCOSE SERPL-MCNC: 142 MG/DL (ref 65–100)
HGB BLD-MCNC: 11.4 G/DL (ref 13.6–17.2)
POTASSIUM SERPL-SCNC: 4.2 MMOL/L (ref 3.5–5.1)
SODIUM SERPL-SCNC: 140 MMOL/L (ref 136–145)

## 2019-09-12 PROCEDURE — 97535 SELF CARE MNGMENT TRAINING: CPT

## 2019-09-12 PROCEDURE — 74011250637 HC RX REV CODE- 250/637: Performed by: PHYSICIAN ASSISTANT

## 2019-09-12 PROCEDURE — 74011250637 HC RX REV CODE- 250/637: Performed by: ORTHOPAEDIC SURGERY

## 2019-09-12 PROCEDURE — 85018 HEMOGLOBIN: CPT

## 2019-09-12 PROCEDURE — 97110 THERAPEUTIC EXERCISES: CPT

## 2019-09-12 PROCEDURE — 97150 GROUP THERAPEUTIC PROCEDURES: CPT

## 2019-09-12 PROCEDURE — 36415 COLL VENOUS BLD VENIPUNCTURE: CPT

## 2019-09-12 PROCEDURE — 94762 N-INVAS EAR/PLS OXIMTRY CONT: CPT

## 2019-09-12 PROCEDURE — 80048 BASIC METABOLIC PNL TOTAL CA: CPT

## 2019-09-12 PROCEDURE — 65270000029 HC RM PRIVATE

## 2019-09-12 PROCEDURE — 97116 GAIT TRAINING THERAPY: CPT

## 2019-09-12 PROCEDURE — 74011250636 HC RX REV CODE- 250/636: Performed by: PHYSICIAN ASSISTANT

## 2019-09-12 RX ORDER — CALCIUM CARBONATE 200(500)MG
200 TABLET,CHEWABLE ORAL
Status: DISCONTINUED | OUTPATIENT
Start: 2019-09-12 | End: 2019-09-13 | Stop reason: HOSPADM

## 2019-09-12 RX ADMIN — Medication 1 AMPULE: at 07:58

## 2019-09-12 RX ADMIN — HYDROMORPHONE HYDROCHLORIDE 2 MG: 2 TABLET ORAL at 03:39

## 2019-09-12 RX ADMIN — CELECOXIB 200 MG: 200 CAPSULE ORAL at 19:34

## 2019-09-12 RX ADMIN — Medication 1 AMPULE: at 19:37

## 2019-09-12 RX ADMIN — HYDROMORPHONE HYDROCHLORIDE 1 MG: 1 INJECTION, SOLUTION INTRAMUSCULAR; INTRAVENOUS; SUBCUTANEOUS at 23:45

## 2019-09-12 RX ADMIN — HYDROMORPHONE HYDROCHLORIDE 2 MG: 2 TABLET ORAL at 19:34

## 2019-09-12 RX ADMIN — HYDROMORPHONE HYDROCHLORIDE 2 MG: 2 TABLET ORAL at 08:00

## 2019-09-12 RX ADMIN — CELECOXIB 200 MG: 200 CAPSULE ORAL at 07:59

## 2019-09-12 RX ADMIN — ASPIRIN 81 MG: 81 TABLET, COATED ORAL at 19:35

## 2019-09-12 RX ADMIN — SENNOSIDES AND DOCUSATE SODIUM 2 TABLET: 8.6; 5 TABLET ORAL at 07:59

## 2019-09-12 RX ADMIN — ACETAMINOPHEN 1000 MG: 500 TABLET, FILM COATED ORAL at 23:45

## 2019-09-12 RX ADMIN — Medication 5 ML: at 15:14

## 2019-09-12 RX ADMIN — Medication 3 G: at 01:00

## 2019-09-12 RX ADMIN — ACETAMINOPHEN 1000 MG: 500 TABLET, FILM COATED ORAL at 08:00

## 2019-09-12 RX ADMIN — ACETAMINOPHEN 1000 MG: 500 TABLET, FILM COATED ORAL at 00:16

## 2019-09-12 RX ADMIN — HYDROMORPHONE HYDROCHLORIDE 1 MG: 1 INJECTION, SOLUTION INTRAMUSCULAR; INTRAVENOUS; SUBCUTANEOUS at 14:53

## 2019-09-12 RX ADMIN — CALCIUM CARBONATE 200 MG: 500 TABLET, CHEWABLE ORAL at 15:12

## 2019-09-12 RX ADMIN — TAMSULOSIN HYDROCHLORIDE 0.4 MG: 0.4 CAPSULE ORAL at 08:00

## 2019-09-12 RX ADMIN — ASPIRIN 81 MG: 81 TABLET, COATED ORAL at 08:00

## 2019-09-12 RX ADMIN — ACETAMINOPHEN 1000 MG: 500 TABLET, FILM COATED ORAL at 14:53

## 2019-09-12 RX ADMIN — HYDROMORPHONE HYDROCHLORIDE 2 MG: 2 TABLET ORAL at 12:41

## 2019-09-12 RX ADMIN — HYDROMORPHONE HYDROCHLORIDE 1 MG: 1 INJECTION, SOLUTION INTRAMUSCULAR; INTRAVENOUS; SUBCUTANEOUS at 00:31

## 2019-09-12 NOTE — PROGRESS NOTES
Problem: Mobility Impaired (Adult and Pediatric)  Goal: *Acute Goals and Plan of Care (Insert Text)  Description  GOALS (1-4 days):  (1.)Mr. Kay Yan will move from supine to sit and sit to supine  in bed with INDEPENDENT. (2.)Mr. Kay Yan will transfer from bed to chair and chair to bed with SUPERVISION using the least restrictive device. (3.)Mr. Kay Yan will ambulate with SUPERVISION for 200 feet with the least restrictive device. (4.)Mr. Kay Yan will ambulate up/down 5 steps with No railing with MINIMAL ASSIST with no device. (5.)Mr. Kay Yan will increase right knee ROM to 5°-80°.  ________________________________________________________________________________________________   Outcome: Progressing Towards Goal     PHYSICAL THERAPY JOINT CAMP TKA: Daily Note and PM 9/12/2019  INPATIENT: Hospital Day: 2  [place] / Product Type: Medicare /      NAME/AGE/GENDER: Guicho Suarez is a 71 y.o. male   PRIMARY DIAGNOSIS:  Primary osteoarthritis of right knee [M17.11]   Procedure(s) and Anesthesia Type:     * KNEE ARTHROPLASTY TOTAL/ RIGHT - Spinal (Right)  ICD-10: Treatment Diagnosis:    · Pain in Right Knee (M25.561)  · Stiffness of Right Knee, Not elsewhere classified (M25.661)  · Difficulty in walking, Not elsewhere classified (R26.2)      ASSESSMENT:     Mr. Kay Yan presents with impaired strength & mobility s/p right TKA. Pt also had decreased stability during out of bed activity. This pt will benefit from follow up therapy to help restore safe function prior to returning home with caregiver. Pt. Mary Height to do well this pm.  He increased his gait distance with walker and did stairs several different ways to see which was easier to get in his house. He plans to go home tomorrow. He did tka exercises with cues and assistance. Discussed importance of swelling and row and safety. Told him to anticipate more pain and swelling this evening.     This section established at most recent assessment   PROBLEM LIST (Impairments causing functional limitations):  1. Decreased Strength  2. Decreased ADL/Functional Activities  3. Decreased Transfer Abilities  4. Decreased Ambulation Ability/Technique  5. Decreased Balance  6. Increased Pain  7. Decreased Activity Tolerance  8. Decreased Flexibility/Joint Mobility  9. Decreased [place] with Home Exercise Program   INTERVENTIONS PLANNED: (Benefits and precautions of physical therapy have been discussed with the patient.)  1. bed mobility  2. gait training  3. home exercise program (HEP)  4. Range of Motion: active/assisted/passive  5. Therapeutic Activities  6. therapeutic exercise/strengthening  7. transfer training  8. Group Therapy     TREATMENT PLAN: Frequency/Duration: Follow patient BID for duration of hospital stay to address above goals. Rehabilitation Potential For Stated Goals: Good     RECOMMENDED REHABILITATION/EQUIPMENT: (at time of discharge pending progress): Continue Skilled Therapy and Home Health: Physical Therapy. HISTORY:   History of Present Injury/Illness (Reason for Referral):  Right TKA  Past Medical History/Comorbidities:   Mr. Abdiaziz Mojica  has a past medical history of Arthritis, Atrial fibrillation ([facility] [address] (Dx 5/2019), BPH associated with nocturia, Chronic pain, H/O echocardiogram (07/18/2019), LBBB (left bundle branch block), and Status post total knee replacement, right (9/11/2019). Mr. Abdiaziz Mojica  has a past surgical history that includes hx colonoscopy (2019); hx hernia repair (Right, 2014); hx knee arthroscopy (Right, 2005); and hx afib ablation.   Social History/Living Environment:   Home Environment: Private residence  # Steps to Enter: 5  Rails to Enter: No  One/Two Story Residence: One story  Living Alone: Yes(family to assist)  Support Systems: Child(kinga)  Patient Expects to be Discharged to[de-identified] Private residence  Current DME Used/Available at Home: None  Prior Level of Function/Work/Activity:  Pt was independent without an assistive device prior to this admission   Number of Personal Factors/Comorbidities that affect the Plan of Care: 3+: HIGH COMPLEXITY   EXAMINATION:   Most Recent Physical Functioning:                RLE PROM  R Knee Flexion: 91  R Knee Extension: 8           Bed Mobility  Supine to Sit: Contact guard assistance  Scooting: Contact guard assistance    Transfers  Sit to Stand: Stand-by assistance;Contact guard assistance  Stand to Sit: Stand-by assistance;Contact guard assistance  Bed to Chair: Contact guard assistance    Balance  Sitting: Intact  Standing: With support              Weight Bearing Status  Right Side Weight Bearing: As tolerated  Distance (ft): 270 Feet (ft)(x 2)  Ambulation - Level of Assistance: Stand-by assistance;Contact guard assistance  Assistive Device: Walker, rolling  Speed/Selina: Delayed  Step Length: Left shortened;Right shortened  Stance: Right decreased  Gait Abnormalities: Antalgic  Number of Stairs Trained: 3(x 2)  Stairs - Level of Assistance: Minimum assistance  Rail Use: Both(also with walker and hand held assist)  Interventions: Safety awareness training;Verbal cues     Braces/Orthotics: none    Right Knee Cold  Type: Cryocuff      Body Structures Involved:  1. Joints  2. Muscles Body Functions Affected:  1. Sensory/Pain  2. Movement Related Activities and Participation Affected:  1. General Tasks and Demands  2. Mobility   Number of elements that affect the Plan of Care: 4+: HIGH COMPLEXITY   CLINICAL PRESENTATION:   Presentation: Stable and uncomplicated: LOW COMPLEXITY   CLINICAL DECISION MAKIN [facility] [address] [id number] AM-PAC 6 Clicks   Basic Mobility Inpatient Short Form  How much difficulty does the patient currently have. .. Unable A Lot A Little None   1. Turning over in bed (including adjusting bedclothes, sheets and blankets)? ? 1   ? 2   ? 3   ? 4   2.   Sitting down on and standing up from a chair with arms ( e.g., wheelchair, bedside commode, etc.)   ? 1   ? 2   ? 3   ? 4   3. Moving from lying on back to sitting on the side of the bed?   ? 1   ? 2   ? 3   ? 4   How much help from another person does the patient currently need. .. Total A Lot A Little None   4. Moving to and from a bed to a chair (including a wheelchair)? ? 1   ? 2   ? 3   ? 4   5. Need to walk in hospital room? ? 1   ? 2   ? 3   ? 4   6. Climbing 3-5 steps with a railing? ? 1   ? 2   ? 3   ? 4   © 2007, Trustees of [address] [place] [address] [address] [id number], under license to [employer]. All rights reserved     Score:  Initial: 16 Most Recent: X (Date: -- )    Interpretation of Tool:  Represents activities that are increasingly more difficult (i.e. Bed mobility, Transfers, Gait). Medical Necessity:     · Patient is expected to demonstrate progress in strength, range of motion, balance, coordination and functional technique  ·  to decrease assistance required with bed mobility, transfers & gait   · .  Reason for Services/Other Comments:  · Patient continues to require skilled intervention due to pt not independent with functional mobility   · . Use of outcome tool(s) and clinical judgement create a POC that gives a: Clear prediction of patient's progress: LOW COMPLEXITY            TREATMENT:   (In addition to Assessment/Re-Assessment sessions the following treatments were rendered)     Pre-treatment Symptoms/Complaints:  Knee pain  Pain Initial: numeric scale     Post Session: not rated     Therapeutic Exercise: (45 Minutes):  Exercises per grid below to improve mobility, strength, balance, coordination and dynamic movement of leg - right to improve functional endurance . Required minimal verbal cues to promote proper body alignment and promote proper body mechanics. Progressed range and repetitions as indicated. Gait Training (15 Minutes):  Gait training to improve and/or restore physical functioning as related to mobility, strength and balance.   Ambulated 270 Feet (ft)(x 2) with Stand-by assistance;Contact guard assistance using a Walker, rolling and minimal Safety awareness training;Verbal cues related to their stance phase to promote proper body alignment, promote proper body posture and promote proper body mechanics. Date:  9/11 Date:  9/12 Date:     ACTIVITY/EXERCISE AM PM AM PM AM PM   GROUP THERAPY  ? ?  ?  x  ?  ? Ankle Pumps  10 10a 15a     Quad Sets  10 10a 15a     Gluteal Sets  10 10a 15a     Hip ABd/ADduction  10 10a 15a     Straight Leg Raises  10 10a 15aa     Knee Slides  10 10aa 15aa     Short Arc Quads  10  15aa     Long Arc Quads         Chair Slides    15a              B = bilateral; AA = active assistive; A = active; P = passive      Treatment/Session Assessment:     Response to Treatment:  Pt. Making progress    Education:  ? Home Exercises  ? Fall Precautions  ?  x D/C Instruction Review  x Knee Prosthesis Review  ? Walker Management/Safety ? Adaptive Equipment as Needed       Interdisciplinary Collaboration:   o Physical Therapy Assistant  o Rehabilitation Attendant    After treatment position/precautions:   o Supine in bed  o Bed/Chair-wheels locked  o Bed in low position  o Caregiver at bedside  o Call light within reach  o Family at bedside    Compliance with Program/Exercises: Will assess as treatment progresses. Recommendations/Intent for next treatment session:  Treatment next visit will focus on increasing Mr. Quinones's independence with bed mobility, transfers, gait training, strength/ROM exercises, modalities for pain, and patient education.       Total Treatment Duration:  PT Patient Time In/Time Out  Time In: 1300  Time Out: 2202 [place] , PT

## 2019-09-12 NOTE — PROGRESS NOTES
09/12/19 0815   Oxygen Therapy   O2 Sat (%) 95 %   Pulse via Oximetry 63 beats per minute   O2 Device Room air   Incentive Spirometry Treatment   Actual Volume (ml) 3000 ml   Number of Attempts 1

## 2019-09-12 NOTE — PROGRESS NOTES
Dilaudid 2 mg po for c/o pain. Resting quietly in  bed,dsng D/I. NV status WDL. SCDs on bilaterally. Iceman cooling system in use. Family member at bedside. Call light within reach.

## 2019-09-12 NOTE — PROGRESS NOTES
Problem: Mobility Impaired (Adult and Pediatric)  Goal: *Acute Goals and Plan of Care (Insert Text)  Description  GOALS (1-4 days):  (1.)Mr. Kimberlee Soares will move from supine to sit and sit to supine  in bed with INDEPENDENT. (2.)Mr. Kimberlee Soares will transfer from bed to chair and chair to bed with SUPERVISION using the least restrictive device. (3.)Mr. Kimberlee Soares will ambulate with SUPERVISION for 200 feet with the least restrictive device. (4.)Mr. Kimberlee Soares will ambulate up/down 5 steps with No railing with MINIMAL ASSIST with no device. (5.)Mr. Kimberlee Soares will increase right knee ROM to 5°-80°.  ________________________________________________________________________________________________   Outcome: Progressing Towards Goal     PHYSICAL THERAPY JOINT CAMP TKA: Daily Note and AM 9/12/2019  INPATIENT: Hospital Day: 2  [place] / [place] / Product Type: Medicare /      NAME/AGE/GENDER: Ca Rome is a 71 y.o. male   PRIMARY DIAGNOSIS:  Primary osteoarthritis of right knee [M17.11]   Procedure(s) and Anesthesia Type:     * KNEE ARTHROPLASTY TOTAL/ RIGHT - Spinal (Right)  ICD-10: Treatment Diagnosis:    · Pain in Right Knee (M25.561)  · Stiffness of Right Knee, Not elsewhere classified (M25.661)  · Difficulty in walking, Not elsewhere classified (R26.2)      ASSESSMENT:     Mr. Kimberlee Soares presents with impaired strength & mobility s/p right TKA. Pt also had decreased stability during out of bed activity. This pt will benefit from follow up therapy to help restore safe function prior to returning home with caregiver. Pt. Doing well this am.  Discussed importance of extension. He ambulated well with walker and did tka exercises with cues and assistance. This section established at most recent assessment   PROBLEM LIST (Impairments causing functional limitations):  1. Decreased Strength  2. Decreased ADL/Functional Activities  3. Decreased Transfer Abilities  4.  Decreased Ambulation Ability/Technique  5. Decreased Balance  6. Increased Pain  7. Decreased Activity Tolerance  8. Decreased Flexibility/Joint Mobility  9. Decreased [place] with Home Exercise Program   INTERVENTIONS PLANNED: (Benefits and precautions of physical therapy have been discussed with the patient.)  1. bed mobility  2. gait training  3. home exercise program (HEP)  4. Range of Motion: active/assisted/passive  5. Therapeutic Activities  6. therapeutic exercise/strengthening  7. transfer training  8. Group Therapy     TREATMENT PLAN: Frequency/Duration: Follow patient BID for duration of hospital stay to address above goals. Rehabilitation Potential For Stated Goals: Good     RECOMMENDED REHABILITATION/EQUIPMENT: (at time of discharge pending progress): Continue Skilled Therapy and Home Health: Physical Therapy. HISTORY:   History of Present Injury/Illness (Reason for Referral):  Right TKA  Past Medical History/Comorbidities:   Mr. Kay Yan  has a past medical history of Arthritis, Atrial fibrillation ([facility] [address] (Dx 5/2019), BPH associated with nocturia, Chronic pain, H/O echocardiogram (07/18/2019), LBBB (left bundle branch block), and Status post total knee replacement, right (9/11/2019). Mr. Kay Yan  has a past surgical history that includes hx colonoscopy (2019); hx hernia repair (Right, 2014); hx knee arthroscopy (Right, 2005); and hx afib ablation.   Social History/Living Environment:   Home Environment: Private residence  # Steps to Enter: 5  Rails to Enter: No  One/Two Story Residence: One story  Living Alone: Yes(family to assist)  Support Systems: Child(kinga)  Patient Expects to be Discharged to[de-identified] Private residence  Current DME Used/Available at Home: None  Prior Level of Function/Work/Activity:  Pt was independent without an assistive device prior to this admission   Number of Personal Factors/Comorbidities that affect the Plan of Care: 3+: HIGH COMPLEXITY   EXAMINATION:   Most Recent Physical Functioning:                RLE PROM  R Knee Flexion: 75  R Knee Extension: 10           Bed Mobility  Supine to Sit: Contact guard assistance  Scooting: Contact guard assistance    Transfers  Sit to Stand: Contact guard assistance  Stand to Sit: Contact guard assistance  Bed to Chair: Contact guard assistance    Balance  Sitting: Intact  Standing: With support              Weight Bearing Status  Right Side Weight Bearing: As tolerated  Distance (ft): 75 Feet (ft)  Ambulation - Level of Assistance: Contact guard assistance  Assistive Device: Walker, rolling  Speed/Selina: Delayed  Step Length: Left shortened;Right shortened  Stance: Right decreased  Gait Abnormalities: Antalgic  Interventions: Safety awareness training;Verbal cues     Braces/Orthotics: none    Right Knee Cold  Type: Cryocuff      Body Structures Involved:  1. Joints  2. Muscles Body Functions Affected:  1. Sensory/Pain  2. Movement Related Activities and Participation Affected:  1. General Tasks and Demands  2. Mobility   Number of elements that affect the Plan of Care: 4+: HIGH COMPLEXITY   CLINICAL PRESENTATION:   Presentation: Stable and uncomplicated: LOW COMPLEXITY   CLINICAL DECISION MAKIN [facility] [address] [id number] AM-PAC 6 Clicks   Basic Mobility Inpatient Short Form  How much difficulty does the patient currently have. .. Unable A Lot A Little None   1. Turning over in bed (including adjusting bedclothes, sheets and blankets)? ? 1   ? 2   ? 3   ? 4   2. Sitting down on and standing up from a chair with arms ( e.g., wheelchair, bedside commode, etc.)   ? 1   ? 2   ? 3   ? 4   3. Moving from lying on back to sitting on the side of the bed?   ? 1   ? 2   ? 3   ? 4   How much help from another person does the patient currently need. .. Total A Lot A Little None   4. Moving to and from a bed to a chair (including a wheelchair)? ? 1   ? 2   ? 3   ? 4   5. Need to walk in hospital room? ? 1   ? 2   ? 3   ? 4   6.   Climbing 3-5 steps with a railing? ? 1   ? 2   ? 3   ? 4   © 2007, Trustees of [address] [place] [address] [address] [address], under license to [employer]. All rights reserved     Score:  Initial: 16 Most Recent: X (Date: -- )    Interpretation of Tool:  Represents activities that are increasingly more difficult (i.e. Bed mobility, Transfers, Gait). Medical Necessity:     · Patient is expected to demonstrate progress in strength, range of motion, balance, coordination and functional technique  ·  to decrease assistance required with bed mobility, transfers & gait   · .  Reason for Services/Other Comments:  · Patient continues to require skilled intervention due to pt not independent with functional mobility   · . Use of outcome tool(s) and clinical judgement create a POC that gives a: Clear prediction of patient's progress: LOW COMPLEXITY            TREATMENT:   (In addition to Assessment/Re-Assessment sessions the following treatments were rendered)     Pre-treatment Symptoms/Complaints:  Knee pain  Pain Initial: numeric scale     Post Session: did not rate     Therapeutic Exercise: (15 Minutes):  Exercises per grid below to improve mobility, strength, balance, coordination and dynamic movement of leg - right to improve functional endurance . Required minimal verbal cues to promote proper body alignment and promote proper body mechanics. Progressed range and repetitions as indicated. Gait Training (15 Minutes):  Gait training to improve and/or restore physical functioning as related to mobility, strength and balance. Ambulated 75 Feet (ft) with Contact guard assistance using a Walker, rolling and minimal Safety awareness training;Verbal cues related to their stance phase to promote proper body alignment, promote proper body posture and promote proper body mechanics. Date:  9/11 Date:  9/12 Date:     ACTIVITY/EXERCISE AM PM AM PM AM PM   GROUP THERAPY  ?  ?  ?  ?  ?  ?    Ankle Pumps  10 10a      Quad Sets  10 10a      Gluteal Sets  10 10a      Hip ABd/ADduction  10 10a      Straight Leg Raises  10 10a      Knee Slides  10 10aa      Short Arc Quads  10       Long Arc Quads         Chair Slides                  B = bilateral; AA = active assistive; A = active; P = passive      Treatment/Session Assessment:     Response to Treatment:  Pt. Did well    Education:  ? Home Exercises  ? Fall Precautions  ? ? D/C Instruction Review  x Knee Prosthesis Review  ? Walker Management/Safety ? Adaptive Equipment as Needed       Interdisciplinary Collaboration:   o Registered Nurse    After treatment position/precautions:   o Up in chair  o Bed/Chair-wheels locked  o Caregiver at bedside  o Call light within reach  o RN notified  o Family at bedside    Compliance with Program/Exercises: Will assess as treatment progresses. Recommendations/Intent for next treatment session:  Treatment next visit will focus on increasing Mr. Dunlaps independence with bed mobility, transfers, gait training, strength/ROM exercises, modalities for pain, and patient education.       Total Treatment Duration:  PT Patient Time In/Time Out  Time In: 0830  Time Out: [address], PT

## 2019-09-12 NOTE — PROGRESS NOTES
Problem: Self Care Deficits Care Plan (Adult)  Goal: *Acute Goals and Plan of Care (Insert Text)  Description  GOALS:   DISCHARGE GOALS (in preparation for going home/rehab):  3 days  All goals met 9/12/2019  1. Mr. Juanjo Alejandre will perform one lower body dressing activity with minimal assistance required to demonstrate improved functional mobility and safety. 2.  Mr. Juanjo Alejandre will perform one lower body bathing activity with minimal assistance required to demonstrate improved functional mobility and safety. 3.  Mr. Juanjo Alejandre will perform toileting/toilet transfer with contact guard assistance to demonstrate improved functional mobility and safety. 4.  Mr. Juanjo Alejandre will perform shower transfer with contact guard assistance to demonstrate improved functional mobility and safety. 9/11/2019 1410 by Lexa Horowitz OT  Outcome: Progressing Towards Goal    JOINT CAMP OCCUPATIONAL THERAPY TKA: Daily Note, Discharge and AM 9/12/2019  INPATIENT: Hospital Day: 2  [place] / Product Type: Medicare /      NAME/AGE/GENDER: Flora Vásquez is a 71 y.o. male   PRIMARY DIAGNOSIS:  Primary osteoarthritis of right knee [M17.11]   Procedure(s) and Anesthesia Type:     * KNEE ARTHROPLASTY TOTAL/ RIGHT - Spinal (Right)  ICD-10: Treatment Diagnosis:    · Pain in Right Knee (M25.561)  · Stiffness of Right Knee, Not elsewhere classified (R71.447)      ASSESSMENT:     Mr. Juanjo Alejandre  is s/p R TKA and presents with decreased weight bearing on R LE and decreased independence with functional mobility and activities of daily living. Patient completed shower and dressing as charter below in ADL grid and is ambulating with rolling walker and CGA assist.  Patient has met 4/4 goals and plans to return home with good family support. Family able to provide patient with appropriate level of assistance at this time.   OT reviewed safety precautions throughout session and therapy schedule for the remainder of today. Patient instructed to call for assistance when needing to get up from recliner and all needs in reach. Patient verbalized understanding of call light. This section established at most recent assessment   PROBLEM LIST (Impairments causing functional limitations):  1. Decreased Strength  2. Decreased ADL/Functional Activities  3. Decreased Transfer Abilities  4. Increased Pain  5. Increased Fatigue  6. Decreased Flexibility/Joint Mobility  7. Decreased Knowledge of Precautions   INTERVENTIONS PLANNED: (Benefits and precautions of occupational therapy have been discussed with the patient.)  1. Activities of daily living training  2. Adaptive equipment training  3. Balance training  4. Clothing management  5. Donning&doffing training  6. Theraputic activity     TREATMENT PLAN: Frequency/Duration: Follow patient qd to address above goals. Rehabilitation Potential For Stated Goals: Excellent     RECOMMENDED REHABILITATION/EQUIPMENT: (at time of discharge pending progress): Continue Skilled Therapy and Home Health: Physical Therapy. OCCUPATIONAL PROFILE AND HISTORY:   History of Present Injury/Illness (Reason for Referral): Pt presents this date s/p (R) TKA. Past Medical History/Comorbidities:   Mr. Guanako Del Cid  has a past medical history of Arthritis, Atrial fibrillation ([facility] [address] (Dx 5/2019), BPH associated with nocturia, Chronic pain, H/O echocardiogram (07/18/2019), LBBB (left bundle branch block), and Status post total knee replacement, right (9/11/2019). Mr. Guanako Del Cid  has a past surgical history that includes hx colonoscopy (2019); hx hernia repair (Right, 2014); hx knee arthroscopy (Right, 2005); and hx afib ablation.   Social History/Living Environment:   Home Environment: Private residence  # Steps to Enter: 5  Rails to Enter: No  One/Two Story Residence: One story  Living Alone: Yes(family to assist)  Support Systems: Child(kinga)  Patient Expects to be Discharged to[employer] residence  Current DME Used/Available at Home: None  Prior Level of Function/Work/Activity:  independent   Number of Personal Factors/Comorbidities that affect the Plan of Care: Brief history (0):  LOW COMPLEXITY   ASSESSMENT OF OCCUPATIONAL PERFORMANCE[de-identified]   Most Recent Physical Functioning:   Balance  Sitting: Intact  Standing: With support                              Mental Status  Neurologic State: Alert  Orientation Level: Oriented X4  Cognition: Follows commands  Perception: Appears intact  Perseveration: No perseveration noted  Safety/Judgement: Fall prevention                Basic ADLs (From Assessment) Complex ADLs (From Assessment)   Basic ADL  Feeding: Setup  Oral Facial Hygiene/Grooming: Setup  Bathing: Moderate assistance  Type of Bath: Chlorhexidine (CHG), Full, Shower  Upper Body Dressing: Setup  Lower Body Dressing: Moderate assistance  Toileting: Contact guard assistance     Grooming/Bathing/Dressing Activities of Daily Living   Grooming  Grooming Assistance: Set-up  Washing Face: Set-up  Brushing Teeth: Set-up  Brushing/Combing Hair: Set-up Cognitive Retraining  Safety/Judgement: Fall prevention   Upper Body Bathing  Bathing Assistance: Set-up  Position Performed: Standing  Cues: Verbal cues provided  Adaptive Equipment: Grab bar Feeding  Feeding Assistance: Set-up   Lower Body Bathing  Bathing Assistance: Set-up  Perineal  : Set-up  Position Performed: Standing  Cues: Verbal cues provided  Adaptive Equipment: Grab bar  Lower Body : Set-up  Position Performed: Standing  Cues: Verbal cues provided  Adaptive Equipment: Grab bar; Shower chair Toileting  Toileting Assistance: Set-up   Upper Body Dressing Assistance  Dressing Assistance: Set-up  Pullover Shirt: Set-up  Cues: Verbal cues provided Functional Transfers  Bathroom Mobility: Contact guard assistance  Toilet Transfer : Contact guard assistance  Shower Transfer: Contact guard assistance  Cues: Verbal cues provided  Adaptive Equipment: Grab bars;Shower chair with back; Walker (comment)   Lower Body Dressing Assistance  Dressing Assistance: Set-up  Underpants: Set-up  Pants With Elastic Waist: Set-up  Slip on Shoes Without Back: Set-up  Position Performed: Bending forward method;Seated in chair;Standing  Cues: Verbal cues provided  Adaptive Equipment Used: Grab bar;Walker Bed/Mat Mobility  Supine to Sit: Contact guard assistance  Sit to Stand: Contact guard assistance  Stand to Sit: Contact guard assistance  Bed to Chair: Contact guard assistance  Scooting: Contact guard assistance         Physical Skills Involved:  1. Balance  2. Strength  3. Activity Tolerance Cognitive Skills Affected (resulting in the inability to perform in a timely and safe manner): 1. none  Psychosocial Skills Affected:  1. none    Number of elements that affect the Plan of Care: 1-3:  LOW COMPLEXITY   CLINICAL DECISION MAKIN[address] [place] [id number] AM-PAC 6 Clicks   Daily Activity Inpatient Short Form  How much help from another person does the patient currently need. .. Total A Lot A Little None   1. Putting on and taking off regular lower body clothing? ? 1    2   ? 3 x  ? 4   2. Bathing (including washing, rinsing, drying)? ? 1    2   ? 3  x ? 4   3. Toileting, which includes using toilet, bedpan or urinal?   ? 1   ? 2    3 x  ? 4   4. Putting on and taking off regular upper body clothing? ? 1   ? 2   ? 3   ? 4   5. Taking care of personal grooming such as brushing teeth? ? 1   ? 2   ? 3   ? 4   6. Eating meals? ? 1   ? 2   ? 3   ? 4   © 2007, Trustees of [address] [place] [address], under license to [employer]. All rights reserved     Score:  Initial: 19 Most Recent: 24 (Date: 19 )    Interpretation of Tool:  Represents activities that are increasingly more difficult (i.e. Bed mobility, Transfers, Gait).     Medical Necessity:     · Patient is expected to demonstrate progress in strength, balance, coordination and functional technique  ·  to increase independence with self care and functional mobility   · . Reason for Services/Other Comments:  · Patient continues to require skilled intervention due to decrease self care and mobility   · . Use of outcome tool(s) and clinical judgement create a POC that gives a: LOW COMPLEXITY            TREATMENT:   (In addition to Assessment/Re-Assessment sessions the following treatments were rendered)     Pre-treatment Symptoms/Complaints:  tolerated shower  Pain: Initial:   Pain Intensity 1: 0  Post Session: 0     Self Care: (40): Procedure(s) (per grid) utilized to improve and/or restore self-care/home management as related to dressing, bathing, toileting and grooming. Required minimal verbal and   cueing to facilitate activities of daily living skills and compensatory activities. Treatment/Session Assessment:     Response to Treatment:  tolerated well    Education:  ? Home Exercises  ? Fall Precautions  ? Hip Precautions ? Going Home Video  ? Knee/Hip Prosthesis Review  ? Walker Management/Safety ? Adaptive Equipment as Needed       Interdisciplinary Collaboration:   o Occupational Therapist  o Registered Nurse    After treatment position/precautions:   o Up in chair  o Bed/Chair-wheels locked  o Caregiver at bedside  o Call light within reach  o RN notified  o Les reclined      Compliance with Program/Exercises: Compliant all of the time. Recommendations/Intent for next treatment session:  Pt doing well all goals met and will do well at home with support from family. Patient will be discharged home with home health PT. No further Occupational Therapy warranted, will discharge Occupational Therapy services.       Total Treatment Duration:  OT Patient Time In/Time Out  Time In: 7984  Time Out: [address], OT

## 2019-09-12 NOTE — PROGRESS NOTES
Sitting up in recliner,Family member at bedside. Dsng dry and intact. Neurovascular status WDL. Iceman cooling system in use. Call light within reach.

## 2019-09-12 NOTE — PROGRESS NOTES
Back from group therapy. [place] checks remain WDL. Medicated for pain with dilaudid IV. Tums given for c/o indigestion. Daughter in room.

## 2019-09-12 NOTE — PROGRESS NOTES
2019         Post Op day: 1 Day Post-OpProcedure(s) (LRB):  KNEE ARTHROPLASTY TOTAL/ RIGHT (Right)      Admit Date: 2019  Admit Diagnosis: Primary osteoarthritis of right knee [M17.11]  Osteoarthritis [M19.90]    LAB:    Recent Results (from the past 24 hour(s))   HEMOGLOBIN    Collection Time: 19  7:42 PM   Result Value Ref Range    HGB 12.5 (L) 13.6 - 17.2 g/dL   HEMOGLOBIN    Collection Time: 19  3:39 AM   Result Value Ref Range    HGB 11.4 (L) 13.6 - 08.7 g/dL   METABOLIC PANEL, BASIC    Collection Time: 19  3:39 AM   Result Value Ref Range    Sodium 140 136 - 145 mmol/L    Potassium 4.2 3.5 - 5.1 mmol/L    Chloride 110 (H) 98 - 107 mmol/L    CO2 24 21 - 32 mmol/L    Anion gap 6 (L) 7 - 16 mmol/L    Glucose 142 (H) 65 - 100 mg/dL    BUN 15 8 - 23 MG/DL    Creatinine 0.87 0.8 - 1.5 MG/DL    GFR est AA >60 >60 ml/min/1.73m2    GFR est non-AA >60 >60 ml/min/1.73m2    Calcium 8.4 8.3 - 10.4 MG/DL     Vital Signs:    Patient Vitals for the past 8 hrs:   BP Temp Pulse Resp SpO2   19 0335 110/70 97.4 °F (36.3 °C) 68 16 96 %     Temp (24hrs), Av.7 °F (36.5 °C), Min:97.4 °F (36.3 °C), Max:98.3 °F (36.8 °C)    Body mass index is 32.25 kg/m².   Pain Control:   Pain Assessment  Pain Scale 1: Numeric (0 - 10)  Pain Intensity 1: 5  Pain Onset 1: YRS  Pain Location 1: Knee  Pain Orientation 1: Right  Pain Description 1: Aching  Pain Intervention(s) 1: Medication (see MAR)    Subjective: Doing well, No complaints, No SOB, No Chest Pain, No nausea or vomiting     Objective: Vital Signs are Stable, No Acute Distress, Alert and Oriented, Dressing is dry,  Neurovascular exam is normal.       PT/OT:            Assistive Device: Walker (comment)     RLE PROM  R Knee Flexion: 60(~post op)  R Knee Extension: -10(~post op)          Wieght Bearing Status: WBAT    Meds:  [unfilled]  [unfilled]  [unfilled]    Assessment:   Patient Active Problem List   Diagnosis Code    Osteoarthritis of right knee M17.11    Status post total knee replacement, right Z96.651             Plan: Continue Physical Therapy, Monitor labs, home today or tomorrow.         Signed By: Evelio Hernández MD

## 2019-09-13 VITALS
BODY MASS INDEX: 32.12 KG/M2 | RESPIRATION RATE: 20 BRPM | HEART RATE: 70 BPM | DIASTOLIC BLOOD PRESSURE: 63 MMHG | HEIGHT: 77 IN | TEMPERATURE: 98 F | OXYGEN SATURATION: 98 % | WEIGHT: 272 LBS | SYSTOLIC BLOOD PRESSURE: 105 MMHG

## 2019-09-13 LAB — HGB BLD-MCNC: 10.7 G/DL (ref 13.6–17.2)

## 2019-09-13 PROCEDURE — 97116 GAIT TRAINING THERAPY: CPT

## 2019-09-13 PROCEDURE — 36415 COLL VENOUS BLD VENIPUNCTURE: CPT

## 2019-09-13 PROCEDURE — 77030012935 HC DRSG AQUACEL BMS -B

## 2019-09-13 PROCEDURE — 97150 GROUP THERAPEUTIC PROCEDURES: CPT

## 2019-09-13 PROCEDURE — 74011250637 HC RX REV CODE- 250/637: Performed by: PHYSICIAN ASSISTANT

## 2019-09-13 PROCEDURE — 85018 HEMOGLOBIN: CPT

## 2019-09-13 PROCEDURE — 74011250636 HC RX REV CODE- 250/636: Performed by: PHYSICIAN ASSISTANT

## 2019-09-13 RX ADMIN — SENNOSIDES AND DOCUSATE SODIUM 2 TABLET: 8.6; 5 TABLET ORAL at 07:59

## 2019-09-13 RX ADMIN — HYDROMORPHONE HYDROCHLORIDE 2 MG: 2 TABLET ORAL at 04:23

## 2019-09-13 RX ADMIN — TAMSULOSIN HYDROCHLORIDE 0.4 MG: 0.4 CAPSULE ORAL at 07:59

## 2019-09-13 RX ADMIN — ASPIRIN 81 MG: 81 TABLET, COATED ORAL at 07:58

## 2019-09-13 RX ADMIN — HYDROMORPHONE HYDROCHLORIDE 2 MG: 2 TABLET ORAL at 11:38

## 2019-09-13 RX ADMIN — ACETAMINOPHEN 1000 MG: 500 TABLET, FILM COATED ORAL at 04:23

## 2019-09-13 RX ADMIN — Medication 1 AMPULE: at 07:57

## 2019-09-13 RX ADMIN — HYDROMORPHONE HYDROCHLORIDE 2 MG: 2 TABLET ORAL at 08:06

## 2019-09-13 RX ADMIN — CELECOXIB 200 MG: 200 CAPSULE ORAL at 07:58

## 2019-09-13 NOTE — DISCHARGE INSTRUCTIONS
Patient Education        Total Knee Replacement: What to Expect at Home  Your Recovery    When you leave the hospital, you should be able to move around with a walker or crutches. But you will need someone to help you at home for the next few weeks or until you have more energy and can move around better. If you need more extensive rehab, you may go to a specialized rehab center for more treatment. You will go home with a bandage and stitches, staples, tissue glue, or tape strips. Change the bandage as your doctor tells you to. If you have stitches or staples, your doctor will remove them 10 to 21 days after your surgery. Glue or tape strips will fall off on their own over time. You may still have some mild pain, and the area may be swollen for 3 to 6 months after surgery. Your knee will continue to improve for 6 to 12 months. You will probably use a walker for 1 to 3 weeks and then use crutches. When you are ready, you can use a cane. You will probably be able to walk on your own in 4 to 8 weeks. You will need to do months of physical rehabilitation (rehab) after a knee replacement. Rehab will help you strengthen the muscles of the knee and help you regain movement. After you recover, your artificial knee will allow you to do normal daily activities with less pain or no pain at all. You may be able to hike, dance, ride a bike, and play golf. Talk to your doctor about whether you can do more strenuous activities. Always tell your caregivers that you have an artificial knee. How long it will take to walk on your own, return to normal activities, and go back to work depends on your health and how well your rehabilitation (rehab) program goes. The better you do with your rehab exercises, the quicker you will get your strength and movement back. This care sheet gives you a general idea about how long it will take for you to recover. But each person recovers at a different pace.  Follow the steps below to get better as quickly as possible. How can you care for yourself at home? Activity    · Rest when you feel tired. You may take a nap, but do not stay in bed all day. When you sit, use a chair with arms. You can use the arms to help you stand up.     · Work with your physical therapist to find the best way to exercise. What you can do as your knee heals will depend on whether your new knee is cemented or uncemented. You may not be able to do certain things for a while if your new knee is uncemented.     · After your knee has healed enough, you can do more strenuous activities with caution. ? You can golf, but use a golf cart, and do not wear shoes with spikes. ? You can bike on a flat road or on a stationary bike. Avoid biking up hills. ? Your doctor may suggest that you stay away from activities that put stress on your knee. These include tennis or badminton, squash or racquetball, contact sports like football, jumping (such as in basketball), jogging, or running. ? Avoid activities where you might fall. These include horseback riding, skiing, and mountain biking.     · Do not sit for more than 1 hour at a time. Get up and walk around for a while before you sit again. If you must sit for a long time, prop up your leg with a chair or footstool. This will help you avoid swelling.     · Ask your doctor when you can drive again. It may take up to 8 weeks after knee replacement surgery before it is safe for you to drive.     · When you get into a car, sit on the edge of the seat. Then pull in your legs, and turn to face the front.     · You should be able to do many everyday activities 3 to 6 weeks after your surgery. You will probably need to take 4 to 16 weeks off from work. When you can go back to work depends on the type of work you do and how you feel.     · Ask your doctor when it is okay for you to have sex.     · Do not lift anything heavier than 10 pounds and do not lift weights for 12 weeks.    Diet    · By the time you leave the hospital, you should be eating your normal diet. If your stomach is upset, try bland, low-fat foods like plain rice, broiled chicken, toast, and yogurt. Your doctor may suggest that you take iron and vitamin supplements.     · Drink plenty of fluids (unless your doctor tells you not to).   · Eat healthy foods, and watch your portion sizes. Try to stay at your ideal weight. Too much weight puts more stress on your new knee.     · You may notice that your bowel movements are not regular right after your surgery. This is common. Try to avoid constipation and straining with bowel movements. You may want to take a fiber supplement every day. If you have not had a bowel movement after a couple of days, ask your doctor about taking a mild laxative. Medicines    · Your doctor will tell you if and when you can restart your medicines. He or she will also give you instructions about taking any new medicines.     · If you take blood thinners, such as warfarin (Coumadin), clopidogrel (Plavix), or aspirin, be sure to talk to your doctor. He or she will tell you if and when to start taking those medicines again. Make sure that you understand exactly what your doctor wants you to do.     · Your doctor may give you a blood-thinning medicine to prevent blood clots. If you take a blood thinner, be sure you get instructions about how to take your medicine safely. Blood thinners can cause serious bleeding problems. This medicine could be in pill form or as a shot (injection). If a shot is necessary, your doctor will tell you how to do this.     · Be safe with medicines. Take pain medicines exactly as directed. ? If the doctor gave you a prescription medicine for pain, take it as prescribed. ? If you are not taking a prescription pain medicine, ask your doctor if you can take an over-the-counter medicine. ? Plan to take your pain medicine 30 minutes before exercises.  It is easier to prevent pain before it starts than to stop it once it has started.     · If you think your pain medicine is making you sick to your stomach:  ? Take your medicine after meals (unless your doctor has told you not to). ? Ask your doctor for a different pain medicine.     · If your doctor prescribed antibiotics, take them as directed. Do not stop taking them just because you feel better. You need to take the full course of antibiotics. Incision care    · If your doctor told you how to care for your cut (incision), follow your doctor's instructions. You will have a dressing over the cut. A dressing helps the incision heal and protects it. Your doctor will tell you how to take care of this.     · If you did not get instructions, follow this general advice:  ? If you have strips of tape on the cut the doctor made, leave the tape on for a week or until it falls off.  ? If you have stitches or staples, your doctor will tell you when to come back to have them removed. ? If you have skin adhesive on the cut, leave it on until it falls off. Skin adhesive is also called glue or liquid stitches. ? Change the bandage every day. ? Wash the area daily with warm water, and pat it dry. Don't use hydrogen peroxide or alcohol. They can slow healing. ? You may cover the area with a gauze bandage if it oozes fluid or rubs against clothing. ? You may shower 24 to 48 hours after surgery. Pat the incision dry. Don't swim or take a bath for the first 2 weeks, or until your doctor tells you it is okay. Exercise    · Your rehab program will give you a number of exercises to do to help you get back your knee's range of motion and strength. Always do them as your therapist tells you. Ice and elevation    · For pain and swelling, put ice or a cold pack on the area for 10 to 20 minutes at a time. Put a thin cloth between the ice and your skin. Other instructions    · Continue to wear your support stockings as your doctor says. These help to prevent blood clots.  The length of time that you will have to wear them depends on your activity level and the amount of swelling.     · You have metal pieces in your knee. These may set off some airport metal detectors. Carry a medical alert card that says you have an artificial joint, just in case. Follow-up care is a key part of your treatment and safety. Be sure to make and go to all appointments, and call your doctor if you are having problems. It's also a good idea to know your test results and keep a list of the medicines you take. When should you call for help? Call 911 anytime you think you may need emergency care. For example, call if:    · You passed out (lost consciousness).     · You have severe trouble breathing.     · You have sudden chest pain and shortness of breath, or you cough up blood.    Call your doctor now or seek immediate medical care if:    · You have signs of infection, such as:  ? Increased pain, swelling, warmth, or redness. ? Red streaks leading from the incision. ? Pus draining from the incision. ? A fever.     · You have signs of a blood clot, such as:  ? Pain in your calf, back of the knee, thigh, or groin. ? Redness and swelling in your leg or groin.     · Your incision comes open and begins to bleed, or the bleeding increases.     · You have pain that does not get better after you take pain medicine.    Watch closely for changes in your health, and be sure to contact your doctor if:    · You do not have a bowel movement after taking a laxative. Where can you learn more? Go to http://aida-regulo.info/. Enter Q876 in the search box to learn more about \"Total Knee Replacement: What to Expect at Home. \"  Current as of: September 20, 2018  Content Version: 12.1  © 8069-0850 [employer]. Care instructions adapted under license by [employer] (which disclaims liability or warranty for this information).  If you have questions about a medical condition or this instruction, always ask your healthcare professional. [place] [address] any warranty or liability for your use of this information. DISCHARGE SUMMARY from Nurse    PATIENT INSTRUCTIONS:    After general anesthesia or intravenous sedation, for 24 hours or while taking prescription Narcotics:  · Limit your activities  · Do not drive and operate hazardous machinery  · Do not make important personal or business decisions  · Do  not drink alcoholic beverages  · If you have not urinated within 8 hours after discharge, please contact your surgeon on call. Report the following to your surgeon:  · Excessive pain, swelling, redness or odor of or around the surgical area  · Temperature over 100.5  · Nausea and vomiting lasting longer than 4 hours or if unable to take medications  · Any signs of decreased circulation or nerve impairment to extremity: change in color, persistent  numbness, tingling, coldness or increase pain  · Any questions    What to do at Home:  Recommended activity: {discharge activity:96570}, ***    If you experience any of the following symptoms ***, please follow up with ***. *  Please give a list of your current medications to your Primary Care Provider. *  Please update this list whenever your medications are discontinued, doses are      changed, or new medications (including over-the-counter products) are added. *  Please carry medication information at all times in case of emergency situations. These are general instructions for a healthy lifestyle:    No smoking/ No tobacco products/ Avoid exposure to second hand smoke  Surgeon General's Warning:  Quitting smoking now greatly reduces serious risk to your health.     Obesity, smoking, and sedentary lifestyle greatly increases your risk for illness    A healthy diet, regular physical exercise & weight monitoring are important for maintaining a healthy lifestyle    You may be retaining fluid if you have a history of heart failure or if you experience any of the following symptoms:  Weight gain of 3 pounds or more overnight or 5 pounds in a week, increased swelling in our hands or feet or shortness of breath while lying flat in bed. Please call your doctor as soon as you notice any of these symptoms; do not wait until your next office visit. The discharge information has been reviewed with the {PATIENT PARENT GUARDIAN:71598}. The {PATIENT PARENT GUARDIAN:44748} verbalized understanding. Discharge medications reviewed with the {Dishcarge meds reviewed ZEMD:21312} and appropriate educational materials and side effects teaching were provided. ___________________________________________________________________________________________________________________________________[facility]   Patient Discharge Instructions    Kade Leela / [id number] : 1955    Admitted 2019 Discharged: 2019     IF YOU HAVE ANY PROBLEMS ONCE YOU ARE AT HOME CALL THE FOLLOWING NUMBERS:   Main office number: [phone]      Medications    · The medications you are to continue on are listed on the medication reconciliation sheet. · Narcotic pain medications as well as supplemental iron can cause constipation. If this occurs try stopping the narcotic pain medication and/or the iron. · It is important that you take the medication exactly as they are prescribed. · Medications which increase your risk of blood clots are listed to stop for 5 weeks after surgery as well as medications or supplements which increase your risk of bleeding complications. · Keep your medication in the bottles provided by the pharmacist and keep a list of the medication names, dosages, and times to be taken in your wallet. · Do not take other medications without consulting your doctor. Important Information    Do NOT smoke as this will greatly increase your risk of infection! Resume your prehospital diet.  If you have excessive nausea or vomitting call your doctor's office     Leg swelling and warmth is normal for 6 months after surgery. If you experience swelling in your leg elevate you leg while laying down with your toes above your heart. If you have sudden onset severe swelling with leg pain call our office. Use Zheng Hose stockings until we see you in the office for your follow up appointment. The stitches deep inside take approximately 6 months to dissolve. There will be sharp shooting, stinging and burning pain. This is normal and will resolve between 3-6 months after surgery. Difficulty sleeping is normal following total Knee and Hip replacement. You may try melatonin, an over-the-counter sleep aid or benadryl to help with sleep. Most patients will resume sleeping through the night 8 weeks after surgery. Home Physical Therapy is arranged. Home Health will contact you within 48 hrs of discharge that you have chosen. If you have not received a call within this time frame please contact that provider you chose. You should be given this information before you leave the hospital.     You are at a risk for falls. Use the rolling walker when walking. Patients who have had a joint replacement should not drive if they are still taking narcotic pain mediation during the daytime hours. Most patients wean themselves off of pain medication within 2-5 weeks after surgery. When to Call the office    - If you have a temperature greater then 101  - Uncontrolled vomiting   - Loose control of your bladder or bowel function  - Are unable to bear any wieght   - Need a pain medication refill     Information obtained by :  I understand that if any problems occur once I am at home I am to contact my physician. I understand and acknowledge receipt of the instructions indicated above. Physician's or R.N.'s Signature                                                                  Date/Time                                                                                                                                              Patient or Representative Signature                                                          Date/Time

## 2019-09-13 NOTE — PROGRESS NOTES
Assisted to bathroom,gait steady using walker. Voids QS. Sitting up in recliner,Call light within reach.

## 2019-09-13 NOTE — PROGRESS NOTES
Sitting up in bed with iceman to knee. Medicated for pain with dilaudid po. NV checks WDL. Aquacel dry and intact. SCDs on. Using IS.

## 2019-09-13 NOTE — PROGRESS NOTES
Problem: Mobility Impaired (Adult and Pediatric)  Goal: *Acute Goals and Plan of Care (Insert Text)  Description  GOALS (1-4 days):  (1.)Mr. Bayron Galindo will move from supine to sit and sit to supine  in bed with INDEPENDENT. (2.)Mr. Bayron Galindo will transfer from bed to chair and chair to bed with SUPERVISION using the least restrictive device. goal met  (3.)Mr. Bayron Galnido will ambulate with SUPERVISION for 200 feet with the least restrictive device. goal met  (4.)Mr. Bayron Galindo will ambulate up/down 5 steps with No railing with MINIMAL ASSIST with no device. (5.)Mr. Bayron Galindo will increase right knee ROM to 5°-80°.  ________________________________________________________________________________________________   Outcome: Progressing Towards Goal     PHYSICAL THERAPY JOINT CAMP TKA: Daily Note and PM 9/13/2019  INPATIENT: Hospital Day: 3  [place] / [place] / Product Type: Medicare /      NAME/AGE/GENDER: Sherry Kovacs is a 71 y.o. male   PRIMARY DIAGNOSIS:  Primary osteoarthritis of right knee [M17.11]   Procedure(s) and Anesthesia Type:     * KNEE ARTHROPLASTY TOTAL/ RIGHT - Spinal (Right)  ICD-10: Treatment Diagnosis:    · Pain in Right Knee (M25.561)  · Stiffness of Right Knee, Not elsewhere classified (M25.661)  · Difficulty in walking, Not elsewhere classified (R26.2)      ASSESSMENT:     Mr. Bayron Galindo presents with impaired strength & mobility s/p right TKA. Pt also had decreased stability during out of bed activity. This pt will benefit from follow up therapy to help restore safe function prior to returning home with caregiver. Pt. Is doing well with no complaints. He ambulated well with walker and did stairs again, this time with cane and hand held assistance and did well. He did tka exercises with cues and assistance. Good rom. Discussed importance of swelling and row and safety. He had no questions or concerns about going home today.     This section established at most recent assessment   PROBLEM LIST (Impairments causing functional limitations):  1. Decreased Strength  2. Decreased ADL/Functional Activities  3. Decreased Transfer Abilities  4. Decreased Ambulation Ability/Technique  5. Decreased Balance  6. Increased Pain  7. Decreased Activity Tolerance  8. Decreased Flexibility/Joint Mobility  9. Decreased [place] with Home Exercise Program   INTERVENTIONS PLANNED: (Benefits and precautions of physical therapy have been discussed with the patient.)  1. bed mobility  2. gait training  3. home exercise program (HEP)  4. Range of Motion: active/assisted/passive  5. Therapeutic Activities  6. therapeutic exercise/strengthening  7. transfer training  8. Group Therapy     TREATMENT PLAN: Frequency/Duration: Follow patient BID for duration of hospital stay to address above goals. Rehabilitation Potential For Stated Goals: Good     RECOMMENDED REHABILITATION/EQUIPMENT: (at time of discharge pending progress): Continue Skilled Therapy and Home Health: Physical Therapy. HISTORY:   History of Present Injury/Illness (Reason for Referral):  Right TKA  Past Medical History/Comorbidities:   Mr. William Murphy  has a past medical history of Arthritis, Atrial fibrillation ([facility] [address] (Dx 5/2019), BPH associated with nocturia, Chronic pain, H/O echocardiogram (07/18/2019), LBBB (left bundle branch block), and Status post total knee replacement, right (9/11/2019). Mr. William Murphy  has a past surgical history that includes hx colonoscopy (2019); hx hernia repair (Right, 2014); hx knee arthroscopy (Right, 2005); and hx afib ablation.   Social History/Living Environment:   Home Environment: Private residence  # Steps to Enter: 5  Rails to Enter: No  One/Two Story Residence: One story  Living Alone: Yes(family to assist)  Support Systems: Child(kinga)  Patient Expects to be Discharged to[de-identified] Private residence  Current DME Used/Available at Home: None  Prior Level of Function/Work/Activity:  Pt was independent without an assistive device prior to this admission   Number of Personal Factors/Comorbidities that affect the Plan of Care: 3+: HIGH COMPLEXITY   EXAMINATION:   Most Recent Physical Functioning:                RLE PROM  R Knee Flexion: 93  R Knee Extension: 8                Transfers  Sit to Stand: Supervision;Stand-by assistance  Stand to Sit: Supervision;Stand-by assistance    Balance  Sitting: Intact  Standing: With support              Weight Bearing Status  Right Side Weight Bearing: As tolerated  Distance (ft): 270 Feet (ft)(x 2)  Ambulation - Level of Assistance: Stand-by assistance;Supervision  Assistive Device: Walker, rolling  Speed/Selina: Delayed  Stance: Right decreased  Gait Abnormalities: Antalgic  Number of Stairs Trained: 3(x 2)  Stairs - Level of Assistance: Minimum assistance  Rail Use: None(cane and hand held assist)  Interventions: Safety awareness training;Verbal cues     Braces/Orthotics: none    Right Knee Cold  Type: Cryocuff      Body Structures Involved:  1. Joints  2. Muscles Body Functions Affected:  1. Sensory/Pain  2. Movement Related Activities and Participation Affected:  1. General Tasks and Demands  2. Mobility   Number of elements that affect the Plan of Care: 4+: HIGH COMPLEXITY   CLINICAL PRESENTATION:   Presentation: Stable and uncomplicated: LOW COMPLEXITY   CLINICAL DECISION MAKIN [facility] [address] [id number] AM-PAC 6 Clicks   Basic Mobility Inpatient Short Form  How much difficulty does the patient currently have. .. Unable A Lot A Little None   1. Turning over in bed (including adjusting bedclothes, sheets and blankets)? ? 1   ? 2   ? 3   ? 4   2. Sitting down on and standing up from a chair with arms ( e.g., wheelchair, bedside commode, etc.)   ? 1   ? 2   ? 3   ? 4   3. Moving from lying on back to sitting on the side of the bed?   ? 1   ? 2   ? 3   ? 4   How much help from another person does the patient currently need. .. Total A Lot A Little None   4.   Moving to and from a bed to a chair (including a wheelchair)? ? 1   ? 2   ? 3   ? 4   5. Need to walk in hospital room? ? 1   ? 2   ? 3   ? 4   6. Climbing 3-5 steps with a railing? ? 1   ? 2   ? 3   ? 4   © 2007, Trustees of [address] [place] [address] [address] [id number], under license to [employer]. All rights reserved     Score:  Initial: 16 Most Recent: X (Date: -- )    Interpretation of Tool:  Represents activities that are increasingly more difficult (i.e. Bed mobility, Transfers, Gait). Medical Necessity:     · Patient is expected to demonstrate progress in strength, range of motion, balance, coordination and functional technique  ·  to decrease assistance required with bed mobility, transfers & gait   · .  Reason for Services/Other Comments:  · Patient continues to require skilled intervention due to pt not independent with functional mobility   · . Use of outcome tool(s) and clinical judgement create a POC that gives a: Clear prediction of patient's progress: LOW COMPLEXITY            TREATMENT:   (In addition to Assessment/Re-Assessment sessions the following treatments were rendered)     Pre-treatment Symptoms/Complaints:  Knee pain  Pain Initial: numeric scale     Post Session: no complaints     Therapeutic Exercise: (45 Minutes):  Exercises per grid below to improve mobility, strength, balance, coordination and dynamic movement of leg - right to improve functional endurance . Required minimal verbal cues to promote proper body alignment and promote proper body mechanics. Progressed range and repetitions as indicated. Gait Training (15 Minutes):  Gait training to improve and/or restore physical functioning as related to mobility, strength and balance. Ambulated 270 Feet (ft)(x 2) with Stand-by assistance;Supervision using a Walker, rolling and minimal Safety awareness training;Verbal cues related to their stance phase to promote proper body alignment, promote proper body posture and promote proper body mechanics. Date:  9/11 Date:  9/12 Date:  9/13   ACTIVITY/EXERCISE AM PM AM PM AM PM   GROUP THERAPY  ? ?  ?  x  x  ? Ankle Pumps  10 10a 15a 20a    Quad Sets  10 10a 15a 20a    Gluteal Sets  10 10a 15a 20a    Hip ABd/ADduction  10 10a 15a 20a    Straight Leg Raises  10 10a 15aa 20a    Knee Slides  10 10aa 15aa 20aa    Short Arc Quads  10  15aa 20aa    Long Arc Quads         Chair Slides    15a 20a             B = bilateral; AA = active assistive; A = active; P = passive      Treatment/Session Assessment:     Response to Treatment:  Pt. Has done well    Education:  ? Home Exercises  ? Fall Precautions  ?  x D/C Instruction Review  x Knee Prosthesis Review  ? Walker Management/Safety ? Adaptive Equipment as Needed       Interdisciplinary Collaboration:   o Physical Therapist  o Rehabilitation Attendant    After treatment position/precautions:   o Supine in bed  o Bed/Chair-wheels locked  o Bed in low position  o Caregiver at bedside  o Call light within reach  o Family at bedside    Compliance with Program/Exercises: Compliant most of the time. Recommendations/Intent for next treatment session:  Treatment next visit will focus on increasing Mr. Quinones's independence with bed mobility, transfers, gait training, strength/ROM exercises, modalities for pain, and patient education.       Total Treatment Duration:  PT Patient Time In/Time Out  Time In: 0930  Time Out: Yvonne Khan 60, PT

## 2019-09-13 NOTE — PROGRESS NOTES
2019         Post Op day: 2 Days Post-OpProcedure(s) (LRB):  KNEE ARTHROPLASTY TOTAL/ RIGHT (Right)      Admit Date: 2019  Admit Diagnosis: Primary osteoarthritis of right knee [M17.11]  Osteoarthritis [M19.90]    LAB:    Recent Results (from the past 24 hour(s))   HEMOGLOBIN    Collection Time: 19  4:05 AM   Result Value Ref Range    HGB 10.7 (L) 13.6 - 17.2 g/dL     Vital Signs:    Patient Vitals for the past 8 hrs:   BP Temp Pulse Resp SpO2   19 0419 104/62 97.6 °F (36.4 °C) 73 18 95 %   19 2349 91/56 97.6 °F (36.4 °C) 67 18 96 %     Temp (24hrs), Av.7 °F (36.5 °C), Min:97.6 °F (36.4 °C), Max:98.3 °F (36.8 °C)    Body mass index is 32.25 kg/m².   Pain Control:   Pain Assessment  Pain Scale 1: Numeric (0 - 10)  Pain Intensity 1: 3  Pain Onset 1: YRS  Pain Location 1: Knee  Pain Orientation 1: Right  Pain Description 1: Aching  Pain Intervention(s) 1: Medication (see MAR)    Subjective: Doing well, No complaints, No SOB, No Chest Pain, No nausea or vomiting     Objective: Vital Signs are Stable, No Acute Distress, Alert and Oriented, Dressing is dry,  Neurovascular exam is normal.       PT/OT:            Assistive Device: Walker (comment)     RLE PROM  R Knee Flexion: 91  R Knee Extension: 8          Wieght Bearing Status: WBAT    Meds:  [unfilled]  [unfilled]  [unfilled]    Assessment:   Patient Active Problem List   Diagnosis Code    Osteoarthritis of right knee M17.11    Status post total knee replacement, right Z96.651             Plan: Continue Physical Therapy, Monitor labs, home today        Signed By: Ghazala Kellogg MD

## 2019-09-13 NOTE — PROGRESS NOTES
Back from group therapy. Medicated for pain with dilaudid po again. Given prescriptions for aspirin and dilaudid and instructed how to take. Has follow up appt scheduled with Dr Pilar Crowell. [facility] to see pt for therapy. Instructed to leave bandage in place and to call doctor if having fever, excessive drainage, numbness or other problems. I have reviewed discharge instructions with the patient and daughter. The patient and daughter verbalized understanding.

## 2019-09-16 NOTE — DISCHARGE SUMMARY
[facility]  Total Joint Discharge Summary      Patient ID:  Doron Pablo  [id number]  71 y.o.  1955    Admit date: 9/11/2019  Discharge date and time: 9/13/2019   Admitting Physician: Brooke Boswell MD  Surgeon: Maria D Hunt Course    Admission Diagnoses: Pre op diagnosis: Primary osteoarthritis of right knee [M17.11] . Prior to surgery the patient was seen for consultation in the office or hospital and a complete history and physical was taken as it pertained to their condition. Discharge Diagnoses: Status post total knee replacement, right. Chronic and Acute medical problems addressed during this hospital stay by consulting physicians include: They underwent Procedure(s) (LRB):  KNEE ARTHROPLASTY TOTAL/ RIGHT (Right) for this. Principle Problem: Status post total knee replacement, right. Other Chronic and Acute Medical Issues: managed by the hospitalist during admission included: Principal Problem:    Status post total knee replacement, right (9/11/2019)    Active Problems:    Osteoarthritis of right knee (9/11/2019)                                 Perioperative Antibiotics:  Prior to surgery Ancef 1 to 2 mg was given depending on patient's weight and allergies. If the patient was allergic to Ancef or MRSA positive  the patient was given Vancomycin and Cleocin        Hospital Medications:   No current facility-administered medications for this encounter. Current Outpatient Medications   Medication Sig    HYDROmorphone (DILAUDID) 2 mg tablet Take 1 Tab by mouth every four (4) hours as needed for Pain for up to 30 days. Max Daily Amount: 12 mg.  aspirin delayed-release 81 mg tablet Take 1 Tab by mouth every twelve (12) hours for 35 days.  celecoxib (CELEBREX) 200 mg capsule Take 200 mg by mouth daily.  tamsulosin (FLOMAX) 0.4 mg capsule Take 0.4 mg by mouth daily. Additional DVT Prophylaxis:  DOMENICA Hose, SCDs     Postoperative Blood Report:  If the patient received blood products during their admission they are listed below:     No results found for: PCTEXX  No results found for: PCTABR, ABORH  No results found for: ABORH, ABORHEXT  No results found for: PCTUN  No results found for: PCTCT  No results found for: PCTUDIV  No results found for: PCTXM    Post Op complications: none       Physical Therapy: PT was started on the day of surgery and progressed. PT/OT:          Assistive Device: Walker (comment)     RLE PROM  R Knee Flexion: 93  R Knee Extension: 8            Disposition: Good    Upon Discharge: The wound appears to be healing without any evidence of infection. Hemoglobin at discharge:   Lab Results   Component Value Date/Time    HGB 10.7 (L) 09/13/2019 04:05 AM       Pt Discharged to: [address] [place] [address].     Discharge instructions:  - Refer to the Medication Reconciliation sheet for all discharge medications   -Rx pain medication given   -Resume pre hospital diet              -Ambulate with walker, appropriate total joint protocol  -Follow up in office as scheduled       Signed:  LETI Hughes  9/16/2019  12:55 PM

## 2022-03-19 PROBLEM — Z96.651 STATUS POST TOTAL KNEE REPLACEMENT, RIGHT: Status: ACTIVE | Noted: 2019-09-11

## 2022-03-19 PROBLEM — M17.11 OSTEOARTHRITIS OF RIGHT KNEE: Status: ACTIVE | Noted: 2019-09-11

## 2022-07-22 DIAGNOSIS — M17.12 PRIMARY OSTEOARTHRITIS OF LEFT KNEE: Primary | ICD-10-CM

## 2022-08-01 ENCOUNTER — TELEPHONE (OUTPATIENT)
Dept: ORTHOPEDIC SURGERY | Age: 67
End: 2022-08-01

## 2022-08-01 DIAGNOSIS — M43.16 SPONDYLOLISTHESIS OF LUMBAR REGION: ICD-10-CM

## 2022-08-01 DIAGNOSIS — M54.50 LOW BACK PAIN, UNSPECIFIED BACK PAIN LATERALITY, UNSPECIFIED CHRONICITY, UNSPECIFIED WHETHER SCIATICA PRESENT: Primary | ICD-10-CM

## 2022-08-01 DIAGNOSIS — M47.816 LUMBAR SPONDYLOSIS: ICD-10-CM

## 2022-08-01 DIAGNOSIS — M51.36 DDD (DEGENERATIVE DISC DISEASE), LUMBAR: ICD-10-CM

## 2022-08-01 NOTE — TELEPHONE ENCOUNTER
Call was returned to patient and he states that he never heard from [address] in regards to his MRI of the Lumbar spine. Patient's order will be faxed again today.
Has not  heard  form the  [facility]  [place]  as  of  today
I will STOP taking the medications listed below when I get home from the hospital:  None

## 2022-08-12 ENCOUNTER — TELEPHONE (OUTPATIENT)
Dept: ORTHOPEDIC SURGERY | Age: 67
End: 2022-08-12

## 2022-08-17 ENCOUNTER — TELEPHONE (OUTPATIENT)
Dept: ORTHOPEDIC SURGERY | Age: 67
End: 2022-08-17

## 2022-08-17 NOTE — TELEPHONE ENCOUNTER
Pt called and he still hasnt heard from [facility] in [facility]. Please call pt.

## 2022-08-17 NOTE — TELEPHONE ENCOUNTER
Call returned to patient and message was left.      Order will be sent again to [facility] today

## 2022-08-23 ENCOUNTER — TELEPHONE (OUTPATIENT)
Dept: ORTHOPEDIC SURGERY | Age: 67
End: 2022-08-23

## 2022-08-23 NOTE — TELEPHONE ENCOUNTER
Spoke with patient and [facility] before scheduling a injection.

## 2022-08-23 NOTE — TELEPHONE ENCOUNTER
Pt called and his mri is tomorrow 10am. He was told he needed a injection the same day. Please call pt.

## 2022-08-25 DIAGNOSIS — M51.36 DDD (DEGENERATIVE DISC DISEASE), LUMBAR: ICD-10-CM

## 2022-08-25 DIAGNOSIS — M54.50 LOW BACK PAIN, UNSPECIFIED BACK PAIN LATERALITY, UNSPECIFIED CHRONICITY, UNSPECIFIED WHETHER SCIATICA PRESENT: ICD-10-CM

## 2022-08-25 DIAGNOSIS — M47.816 LUMBAR SPONDYLOSIS: ICD-10-CM

## 2022-08-25 DIAGNOSIS — M43.16 SPONDYLOLISTHESIS OF LUMBAR REGION: ICD-10-CM

## 2022-09-08 NOTE — PROGRESS NOTES
[facility]  Consultation Note  Virtual/Telephone Visit     Patient ID:  Name: Abby Silveira  MRN: [id number]  AGE: 75 y.o.  : 1955    Date of Consultation:  2022    CC:   Chief Complaint   Patient presents with    Back Problem           HPI:  Today's visit was performed through a virtual visit with live audio and video feed. The patient was at home in [place]. I was in the office. No other persons were present. Verbal informed consent was obtained prior to today's visit, which lasted 12 minutes and included evaluation and management of symptoms, review of imaging, review of previous treatments, and discussion of treatment options. Please see the note below for more detailed information regarding today's visit. Mr. Sebastian Ponce is a 41-year-old man who presents today for follow-up of low back pain. He has pain in the left low back. It radiates to the left lateral leg, below the left knee. The pain is aggravated with activity. He is awaiting a left knee replacement with Dr. Corrine Arora later this year. The pain is associate with left leg paresthesias. He had significant relief in 2019 with an interlaminar L5-S1 epidural steroid injection. He also has neck discomfort. MRI lumbar spine was performed 2022. He had anterolisthesis L5-S1. He had moderate to severe bilateral L5-S1 neuroforaminal narrowing and increased STIR signal posterior to the right L4-5 facet joint area. We discussed these findings at length today. Past Medical History Includes:   Past Medical History:   Diagnosis Date    Arthritis     Atrial fibrillation ([address] Dx 2019    KLEBER and DCCV performed (19) and he has maintained NSR since.  EKG dated 19 shows NRS    BPH associated with nocturia     managed with medication    Chronic pain     lower back and legs    H/O echocardiogram 2019    EF 55-65%    LBBB (left bundle branch block)     Stress (19) \"no ischemia,\" Echo (19) EF 55-65%    Status post total knee replacement, right 2019   ,   Past Surgical History:   Procedure Laterality Date    ABLATION OF DYSRHYTHMIC FOCUS      cardioversion     COLONOSCOPY      5 polyps removed    Frankie Vang Right     nOelia Kaplan Standing ARTHROSCOPY Right     States \"either 2005 or \"     Family History:   Family History   Problem Relation Age of Onset    Arrhythmia Mother     Cancer Father       Social History:   Social History     Tobacco Use    Smoking status: Former     Types: Cigarettes     Quit date: 2016     Years since quittin.7    Smokeless tobacco: Never   Substance Use Topics    Alcohol use: Not on file       ALLERGIES: Not on File     Patient Medications    Current Outpatient Medications   Medication Sig    celecoxib (CELEBREX) 200 MG capsule Take 200 mg by mouth daily    tamsulosin (FLOMAX) 0.4 MG capsule Take 0.4 mg by mouth daily     No current facility-administered medications for this visit. ROS/Meds/PSH/PMH/FH/SH: I personally reviewed the patients standard intake form. No flowsheet data found. No results found for any visits on 22. Assessment and Plan:     ICD-10-CM    1. Lumbar spondylosis  M47.816 Injection Authorization - Spine      2. DDD (degenerative disc disease), lumbar  M51.36 Injection Authorization - Spine      3. Spinal stenosis of lumbar region with neurogenic claudication  M48.062 Injection Authorization - Spine          Orders Placed This Encounter   Procedures    Injection Authorization - Spine     Referral Priority:   Routine     Number of Visits Requested:   1        4   This is a chronic illness/condition with exacerbation and progression  Treatment at this time: Discussed Injection therapy at length today, including risks, complications and alternative treatment options. The patient wishes to proceed.  The injection will be performed as left L5-S1 transforaminal epidural steroid injection. If this does not provide significant, sustained pain relief, he may benefit from interlaminar left paramedian L5-S1 epidural steroid injection, which we discussed today. We discussed today that nonsurgical treatment for lumbar radiculopathy includes medications, injections, and therapy. All of these options may help with pain but do not address the underlying anatomical problem. Studies ordered today: none    On this date 9/9/2022 I have spent 12 minutes reviewing previous notes, test results and face to face with the patient discussing the diagnosis and importance of compliance with the treatment plan as well as documenting on the day of the visit. [facility], was evaluated through a synchronous (real-time) audio-video encounter. The patient (or guardian if applicable) is aware that this is a billable service. Verbal consent to proceed has been obtained within the past 12 months. The visit was conducted pursuant to the emergency declaration under the [address] [place] [address] authority and the [employer] General Act. Patient identification was verified, and a caregiver was present when appropriate. The patient was located in a state where the provider was credentialed to provide care.        Js Feliciano MD  9/9/2022,  9:37 AM

## 2022-09-09 ENCOUNTER — TELEMEDICINE (OUTPATIENT)
Dept: ORTHOPEDIC SURGERY | Age: 67
End: 2022-09-09
Payer: MEDICARE

## 2022-09-09 DIAGNOSIS — M48.062 SPINAL STENOSIS OF LUMBAR REGION WITH NEUROGENIC CLAUDICATION: ICD-10-CM

## 2022-09-09 DIAGNOSIS — M51.36 DDD (DEGENERATIVE DISC DISEASE), LUMBAR: ICD-10-CM

## 2022-09-09 DIAGNOSIS — M47.816 LUMBAR SPONDYLOSIS: Primary | ICD-10-CM

## 2022-09-09 PROCEDURE — G8421 BMI NOT CALCULATED: HCPCS | Performed by: PHYSICAL MEDICINE & REHABILITATION

## 2022-09-09 PROCEDURE — G8427 DOCREV CUR MEDS BY ELIG CLIN: HCPCS | Performed by: PHYSICAL MEDICINE & REHABILITATION

## 2022-09-09 PROCEDURE — 1123F ACP DISCUSS/DSCN MKR DOCD: CPT | Performed by: PHYSICAL MEDICINE & REHABILITATION

## 2022-09-09 PROCEDURE — 4004F PT TOBACCO SCREEN RCVD TLK: CPT | Performed by: PHYSICAL MEDICINE & REHABILITATION

## 2022-09-09 PROCEDURE — 3017F COLORECTAL CA SCREEN DOC REV: CPT | Performed by: PHYSICAL MEDICINE & REHABILITATION

## 2022-09-09 PROCEDURE — 99214 OFFICE O/P EST MOD 30 MIN: CPT | Performed by: PHYSICAL MEDICINE & REHABILITATION

## 2022-09-09 NOTE — LETTER
Ileene Jennifer  1955  ______________________________________________________________________    Radiographic Studies:    Cervical MRI/ Contrast        Thoracic MRI/ Contrast        Lumbar MRI/ Contrast    CT Myelogram __________________________________________________    NCS/EMG______________________________________________________    MRI of ________________________________________________________    Other__________________________________________________________      Injections:    _______________________________________________________________    Authorization to stop blood thinners________________________________      Medications:    Oral steroids___________________    Muscle Relaxers___________________    Pain medications_____________________    NSAIDS_____________________    Neuropathic pain medication_________________________________________      Physical Therapy:    Lumbar     Thoracic     Cervical       Other_______________________________      Follow up/ Referral:    Pain referral_______________________________________________________    Referral___________________________________________________________    Follow up_________________________________________________________    Handicapped Parking_______________________________________________    Other_____________________________________________________________

## 2022-09-15 ENCOUNTER — OFFICE VISIT (OUTPATIENT)
Dept: ORTHOPEDIC SURGERY | Age: 67
End: 2022-09-15
Payer: MEDICARE

## 2022-09-15 DIAGNOSIS — M47.816 LUMBAR SPONDYLOSIS: Primary | ICD-10-CM

## 2022-09-15 DIAGNOSIS — M48.062 SPINAL STENOSIS OF LUMBAR REGION WITH NEUROGENIC CLAUDICATION: ICD-10-CM

## 2022-09-15 DIAGNOSIS — M51.36 DDD (DEGENERATIVE DISC DISEASE), LUMBAR: ICD-10-CM

## 2022-09-15 PROCEDURE — 64483 NJX AA&/STRD TFRM EPI L/S 1: CPT | Performed by: PHYSICAL MEDICINE & REHABILITATION

## 2022-09-15 RX ORDER — HYDROCODONE BITARTRATE AND ACETAMINOPHEN 5; 325 MG/1; MG/1
1 TABLET ORAL 2 TIMES DAILY PRN
Qty: 20 TABLET | Refills: 0 | Status: SHIPPED | OUTPATIENT
Start: 2022-09-15 | End: 2022-10-15

## 2022-09-15 RX ORDER — TRIAMCINOLONE ACETONIDE 40 MG/ML
40 INJECTION, SUSPENSION INTRA-ARTICULAR; INTRAMUSCULAR ONCE
Status: COMPLETED | OUTPATIENT
Start: 2022-09-15 | End: 2022-09-15

## 2022-09-15 RX ADMIN — TRIAMCINOLONE ACETONIDE 40 MG: 40 INJECTION, SUSPENSION INTRA-ARTICULAR; INTRAMUSCULAR at 11:51

## 2022-09-15 NOTE — PROGRESS NOTES
Name: Marquis Skinner  YOB: 1955  Gender: male  MRN: [id number]        Transforaminal HANY Procedure Note    Procedure: Left  L5-S1 transforaminal epidural steroid injections     Precautions: Marquis Skinner denies prior sensitivity to steroid, local anesthetic, iodine, or shellfish. Consent:  Consent was obtained prior to the procedure. The procedure was discussed at length with Marquis Skinner. He was given the opportunity to ask questions regarding the procedure and its associated risks. In addition to the potential risks associated with the procedure itself, the patient was informed both verbally and in writing of potential side effects of the use glucocorticoids. The patient appeared to comprehend the informed consent and desired to have the procedure performed, and informed consent was signed. He was placed in a prone position on the fluoroscopy table and the skin was prepped and draped in a routine sterile fashion. The areas to be injected were each anesthetized with 1 ml of 1% Lidocaine. A 22 gauge 3.5 inch spinal needle was carefully advanced under fluoroscopic guidance to the left L5-S1 transforaminal space  0.5 ml of 70% of Omnipaque was injected to confirm proper needle placement and absence of subdural or vascular flow Once proper placement was confirmed, 0.5ml of 0.25 marcaine and 40 mg of kenalog were injected through the spinal needle. Fluoroscopic guidance was used intermittently over a 10-minute period to insure proper needle placement and his safety. A hard copy of the fluoroscopic image has been placed in his chart and is saved on the C-arm hard drive. He was monitored for 30 minutes after the procedure and discharged home in a stable fashion with a routine follow up.     Procedural Diagnosis:     ICD-10-CM    1. Lumbar spondylosis  M47.816 FL NERVE BLOCK LUMBOSACRAL 1ST     triamcinolone acetonide (KENALOG-40) injection 40 mg HYDROcodone-acetaminophen (NORCO) 5-325 MG per tablet      2. DDD (degenerative disc disease), lumbar  M51.36 FL NERVE BLOCK LUMBOSACRAL 1ST     triamcinolone acetonide (KENALOG-40) injection 40 mg     HYDROcodone-acetaminophen (NORCO) 5-325 MG per tablet      3.  Spinal stenosis of lumbar region with neurogenic claudication  M48.062 FL NERVE BLOCK LUMBOSACRAL 1ST     triamcinolone acetonide (KENALOG-40) injection 40 mg     HYDROcodone-acetaminophen (NORCO) 5-325 MG per tablet         Ermelinda Witt MD  09/15/22

## 2022-11-30 DIAGNOSIS — M17.12 PRIMARY OSTEOARTHRITIS OF LEFT KNEE: Primary | ICD-10-CM

## 2022-12-12 ENCOUNTER — HOSPITAL ENCOUNTER (OUTPATIENT)
Dept: REHABILITATION | Age: 67
Discharge: HOME | End: 2022-12-15
Payer: MEDICARE

## 2022-12-12 ENCOUNTER — HOSPITAL ENCOUNTER (OUTPATIENT)
Dept: SURGERY | Age: 67
Discharge: HOME | End: 2022-12-15
Payer: MEDICARE

## 2022-12-12 VITALS
BODY MASS INDEX: 34.24 KG/M2 | HEIGHT: 77 IN | RESPIRATION RATE: 14 BRPM | TEMPERATURE: 98.2 F | OXYGEN SATURATION: 96 % | HEART RATE: 90 BPM | SYSTOLIC BLOOD PRESSURE: 121 MMHG | WEIGHT: 290 LBS | DIASTOLIC BLOOD PRESSURE: 81 MMHG

## 2022-12-12 DIAGNOSIS — R06.83 SNORING: ICD-10-CM

## 2022-12-12 DIAGNOSIS — M17.12 PRIMARY OSTEOARTHRITIS OF LEFT KNEE: Primary | ICD-10-CM

## 2022-12-12 LAB
ANION GAP SERPL CALC-SCNC: 5 MMOL/L (ref 2–11)
APTT PPP: 28.5 SEC (ref 24.5–34.2)
BACTERIA SPEC CULT: NORMAL
BASOPHILS # BLD: 0.1 K/UL (ref 0–0.2)
BASOPHILS NFR BLD: 1 % (ref 0–2)
BUN SERPL-MCNC: 18 MG/DL (ref 8–23)
CALCIUM SERPL-MCNC: 8.8 MG/DL (ref 8.3–10.4)
CHLORIDE SERPL-SCNC: 111 MMOL/L (ref 101–110)
CO2 SERPL-SCNC: 22 MMOL/L (ref 21–32)
CREAT SERPL-MCNC: 0.91 MG/DL (ref 0.8–1.5)
DIFFERENTIAL METHOD BLD: NORMAL
EKG DIAGNOSIS: NORMAL
EKG Q-T INTERVAL: 354 MS
EKG QRS DURATION: 84 MS
EKG QTC CALCULATION (BAZETT): 413 MS
EKG R AXIS: 40 DEGREES
EKG T AXIS: 39 DEGREES
EKG VENTRICULAR RATE: 82 BPM
EOSINOPHIL # BLD: 0.3 K/UL (ref 0–0.8)
EOSINOPHIL NFR BLD: 6 % (ref 0.5–7.8)
ERYTHROCYTE [DISTWIDTH] IN BLOOD BY AUTOMATED COUNT: 13 % (ref 11.9–14.6)
EST. AVERAGE GLUCOSE BLD GHB EST-MCNC: 114 MG/DL
GLUCOSE SERPL-MCNC: 140 MG/DL (ref 65–100)
HBA1C MFR BLD: 5.6 % (ref 4.8–5.6)
HCT VFR BLD AUTO: 43.3 % (ref 41.1–50.3)
HGB BLD-MCNC: 14.3 G/DL (ref 13.6–17.2)
IMM GRANULOCYTES # BLD AUTO: 0 K/UL (ref 0–0.5)
IMM GRANULOCYTES NFR BLD AUTO: 0 % (ref 0–5)
INR PPP: 1.1
LYMPHOCYTES # BLD: 2.1 K/UL (ref 0.5–4.6)
LYMPHOCYTES NFR BLD: 37 % (ref 13–44)
MCH RBC QN AUTO: 31.9 PG (ref 26.1–32.9)
MCHC RBC AUTO-ENTMCNC: 33 G/DL (ref 31.4–35)
MCV RBC AUTO: 96.7 FL (ref 82–102)
MONOCYTES # BLD: 0.4 K/UL (ref 0.1–1.3)
MONOCYTES NFR BLD: 7 % (ref 4–12)
NEUTS SEG # BLD: 2.7 K/UL (ref 1.7–8.2)
NEUTS SEG NFR BLD: 49 % (ref 43–78)
NRBC # BLD: 0 K/UL (ref 0–0.2)
PLATELET # BLD AUTO: 167 K/UL (ref 150–450)
PMV BLD AUTO: 9.4 FL (ref 9.4–12.3)
POTASSIUM SERPL-SCNC: 4.1 MMOL/L (ref 3.5–5.1)
PROTHROMBIN TIME: 13.8 SEC (ref 12.6–14.3)
RBC # BLD AUTO: 4.48 M/UL (ref 4.23–5.6)
SERVICE CMNT-IMP: NORMAL
SODIUM SERPL-SCNC: 138 MMOL/L (ref 133–143)
WBC # BLD AUTO: 5.6 K/UL (ref 4.3–11.1)

## 2022-12-12 PROCEDURE — 85730 THROMBOPLASTIN TIME PARTIAL: CPT

## 2022-12-12 PROCEDURE — 85610 PROTHROMBIN TIME: CPT

## 2022-12-12 PROCEDURE — 83036 HEMOGLOBIN GLYCOSYLATED A1C: CPT

## 2022-12-12 PROCEDURE — 80048 BASIC METABOLIC PNL TOTAL CA: CPT

## 2022-12-12 PROCEDURE — 87641 MR-STAPH DNA AMP PROBE: CPT

## 2022-12-12 PROCEDURE — 85025 COMPLETE CBC W/AUTO DIFF WBC: CPT

## 2022-12-12 PROCEDURE — 97161 PT EVAL LOW COMPLEX 20 MIN: CPT

## 2022-12-12 PROCEDURE — 94760 N-INVAS EAR/PLS OXIMETRY 1: CPT

## 2022-12-12 RX ORDER — SODIUM CHLORIDE 9 MG/ML
INJECTION, SOLUTION INTRAVENOUS PRN
OUTPATIENT
Start: 2022-12-12

## 2022-12-12 RX ORDER — SODIUM CHLORIDE 0.9 % (FLUSH) 0.9 %
5-40 SYRINGE (ML) INJECTION PRN
OUTPATIENT
Start: 2022-12-12

## 2022-12-12 RX ORDER — ACETAMINOPHEN 500 MG
1000 TABLET ORAL ONCE
OUTPATIENT
Start: 2022-12-12 | End: 2022-12-12

## 2022-12-12 RX ORDER — SODIUM CHLORIDE 0.9 % (FLUSH) 0.9 %
5-40 SYRINGE (ML) INJECTION EVERY 12 HOURS SCHEDULED
OUTPATIENT
Start: 2022-12-12

## 2022-12-12 ASSESSMENT — KOOS JR
TWISING OR PIVOTING ON KNEE: 3
RISING FROM SITTING: 2
BENDING TO THE FLOOR TO PICK UP OBJECT: 3
GOING UP OR DOWN STAIRS: 2
STANDING UPRIGHT: 1
HOW SEVERE IS YOUR KNEE STIFFNESS AFTER FIRST WAKING IN MORNING: 3
STRAIGHTENING KNEE FULLY: 0
KOOS JR TOTAL INTERVAL SCORE: 52.465

## 2022-12-12 ASSESSMENT — PULMONARY FUNCTION TESTS
FEV1 (%PREDICTED): 83
FEV1 (LITERS): 3.55

## 2022-12-12 ASSESSMENT — PAIN DESCRIPTION - LOCATION: LOCATION: KNEE

## 2022-12-12 ASSESSMENT — PAIN DESCRIPTION - DESCRIPTORS: DESCRIPTORS: ACHING;SHARP;SHOOTING

## 2022-12-12 NOTE — CARE COORDINATION
Joint Camp Case Management note:  Patient screened in Prehab for discharge planning needs. Patient scheduled for a future total joint replacement. We discussed Home Health and equipment needed after surgery. List of Home Health providers offered. Pt lives in [facility]. He is asking to use [facility] [place] who he used after his last joint replacement. He denies any equipment needs as long as he can find his old equipment in his attic.  (Kassandra Ren and [facility]) which was given in the last 5 yrs

## 2022-12-12 NOTE — PERIOP NOTE
Labs done today within anesthesia protocols.       Latest Reference Range & Units 12/12/22 09:34   Sodium 133 - 143 mmol/L 138   Potassium 3.5 - 5.1 mmol/L 4.1   Chloride 101 - 110 mmol/L 111 (H)   CO2 21 - 32 mmol/L 22   BUN,BUNPL 8 - 23 MG/DL 18   Creatinine 0.8 - 1.5 MG/DL 0.91   Anion Gap 2 - 11 mmol/L 5   Est, Glom Filt Rate >60 ml/min/1.73m2 >60   Glucose, Random 65 - 100 mg/dL 140 (H)   CALCIUM, SERUM, [id number] 8.3 - 10.4 MG/DL 8.8   Hemoglobin A1C 4.8 - 5.6 % 5.6   eAG (mg/dL) mg/dL 114   WBC 4.3 - 11.1 K/uL 5.6   RBC 4.23 - 5.6 M/uL 4.48   Hemoglobin Quant 13.6 - 17.2 g/dL 14.3   Hematocrit 41.1 - 50.3 % 43.3   MCV 82.0 - 102.0 FL 96.7   MCH 26.1 - 32.9 PG 31.9   MCHC 31.4 - 35.0 g/dL 33.0   MPV 9.4 - 12.3 FL 9.4   RDW 11.9 - 14.6 % 13.0   Platelet Count 207 - 450 K/uL 167   Absolute Mono # 0.1 - 1.3 K/UL 0.4   Eosinophils % 0.5 - 7.8 % 6   Basophils Absolute 0.0 - 0.2 K/UL 0.1   Differential Type -   AUTOMATED   Seg Neutrophils 43 - 78 % 49   Segs Absolute 1.7 - 8.2 K/UL 2.7   Lymphocytes 13 - 44 % 37   Absolute Lymph # 0.5 - 4.6 K/UL 2.1   Monocytes 4.0 - 12.0 % 7   Absolute Eos # 0.0 - 0.8 K/UL 0.3   Basophils 0.0 - 2.0 % 1   Immature Granulocytes 0.0 - 5.0 % 0   Nucleated Red Blood Cells 0.0 - 0.2 K/uL 0.00   Absolute Immature Granulocyte 0.0 - 0.5 K/UL 0.0   Prothrombin Time 12.6 - 14.3 sec 13.8   INR -   1.1   PTT 24.5 - 34.2 SEC 28.5   MSSA/MRSA SCREEN BY PCR  Rpt   (H): Data is abnormally high  Rpt: View report in Results Review for more information

## 2022-12-12 NOTE — PERIOP NOTE
Patient verified name and . Order for consent is found in EHR and matches case posting; patient verified. Type 3 surgery, Joint camp assessment complete. Labs per surgeon: CBC,BMP, PT/PTT, mrsa swab, hgba1c ; results (processing)  Labs per anesthesia protocol: no additional  EKG:in emr care everywhere dated 3/28/22 (within [facility] protocols)    Pt with irregular HR at PAT appt. EKG completed today - pt in afib (known hx of afib). Has cardiac clearance appt 22 for surgery clearance. Charge RN to f/u. MRSA/MSSA swab collected; pharmacy to review and dose antibiotic as appropriate. Hospital approved surgical skin cleanser and instructions to return bottle on DOS given per hospital policy. Patient provided with handouts including Guide to Surgery, Pain Management, Hand Hygiene, Blood Transfusion Education, and [place] [facility] Brochure. Patient answered medical/surgical history questions at their best of ability. All prior to admission medications documented in [facility]. Original medication prescription bottle not visualized during patient appointment. Patient instructed to hold all vitamins 3 weeks prior to surgery and NSAIDS 5 days prior to surgery. Patient teach back successful and patient demonstrates knowledge of instruction.

## 2022-12-12 NOTE — PROGRESS NOTES
Total Joint Surgery Preoperative Chart Review      Patient ID:  Samina Millard  [id number]  18 y.o.  1955  Surgeon: Dr. Damian Martell  Date of Surgery: 2022  Procedure: Total Left Knee Arthroplasty  Primary Care Physician: Erik Martinez [phone]  Specialty Physician(s):      Subjective:   Samina Millard is a 75 y.o. White (non-) male who presents for preoperative evaluation for Total Left Knee arthroplasty. This is a preoperative chart review note based on data collected by the nurse at the surgical Pre-Assessment visit. Past Medical History:   Diagnosis Date    Arthritis     Atrial fibrillation ([address] Dx 2019    KLEBER and DCCV performed (19) and he has maintained NSR since. EKG dated 19 shows NRS    BPH associated with nocturia     managed with medication    Chronic pain     lower back and legs    H/O echocardiogram 2019    EF 55-65%    LBBB (left bundle branch block)     Stress (19) \"no ischemia,\" Echo (19) EF 55-65%    Status post total knee replacement, right 2019      Past Surgical History:   Procedure Laterality Date    ABLATION OF DYSRHYTHMIC FOCUS  2021    cardioversion with ablation, EP study    COLONOSCOPY      5 polyps removed    [address] Right     [facility] Dr. Justyn Noyola Right     KNEE ARTHROSCOPY Right     States \"either 2005 or \"     Family History   Problem Relation Age of Onset    Arrhythmia Mother     Cancer Father       Social History     Tobacco Use    Smoking status: Former     Types: Cigarettes     Quit date: 2016     Years since quittin.0    Smokeless tobacco: Never   Substance Use Topics    Alcohol use: Not on file       Prior to Admission medications    Medication Sig Start Date End Date Taking?  Authorizing Provider   Acetaminophen (TYLENOL PO) Take by mouth as needed   Yes Historical Provider, MD     No Known Allergies       Objective:     Physical Exam:   Patient Vitals for the past 24 hrs:   Temp Pulse Resp BP SpO2   12/12/22 0948 98.2 °F (36.8 °C) 90 14 121/81 96 %   12/12/22 0930 -- 85 -- -- 96 %       ECG:    No results found for this or any previous visit (from the past 4464 hour(s)).     Data Review:   Labs:   Recent Results (from the past 24 hour(s))   Protime-INR    Collection Time: 12/12/22  9:34 AM   Result Value Ref Range    Protime 13.8 12.6 - 14.3 sec    INR 1.1     Hemoglobin A1c    Collection Time: 12/12/22  9:34 AM   Result Value Ref Range    Hemoglobin A1C 5.6 4.8 - 5.6 %    eAG 114 mg/dL   CBC with Auto Differential    Collection Time: 12/12/22  9:34 AM   Result Value Ref Range    WBC 5.6 4.3 - 11.1 K/uL    RBC 4.48 4.23 - 5.6 M/uL    Hemoglobin 14.3 13.6 - 17.2 g/dL    Hematocrit 43.3 41.1 - 50.3 %    MCV 96.7 82.0 - 102.0 FL    MCH 31.9 26.1 - 32.9 PG    MCHC 33.0 31.4 - 35.0 g/dL    RDW 13.0 11.9 - 14.6 %    Platelets 156 209 - 843 K/uL    MPV 9.4 9.4 - 12.3 FL    nRBC 0.00 0.0 - 0.2 K/uL    Differential Type AUTOMATED      Seg Neutrophils 49 43 - 78 %    Lymphocytes 37 13 - 44 %    Monocytes 7 4.0 - 12.0 %    Eosinophils % 6 0.5 - 7.8 %    Basophils 1 0.0 - 2.0 %    Immature Granulocytes 0 0.0 - 5.0 %    Segs Absolute 2.7 1.7 - 8.2 K/UL    Absolute Lymph # 2.1 0.5 - 4.6 K/UL    Absolute Mono # 0.4 0.1 - 1.3 K/UL    Absolute Eos # 0.3 0.0 - 0.8 K/UL    Basophils Absolute 0.1 0.0 - 0.2 K/UL    Absolute Immature Granulocyte 0.0 0.0 - 0.5 K/UL   Basic Metabolic Panel    Collection Time: 12/12/22  9:34 AM   Result Value Ref Range    Sodium 138 133 - 143 mmol/L    Potassium 4.1 3.5 - 5.1 mmol/L    Chloride 111 (H) 101 - 110 mmol/L    CO2 22 21 - 32 mmol/L    Anion Gap 5 2 - 11 mmol/L    Glucose 140 (H) 65 - 100 mg/dL    BUN 18 8 - 23 MG/DL    Creatinine 0.91 0.8 - 1.5 MG/DL    Est, Glom Filt Rate >60 >60 ml/min/1.73m2    Calcium 8.8 8.3 - 10.4 MG/DL   APTT    Collection Time: 12/12/22  9:34 AM   Result Value Ref Range    PTT 28.5 24.5 - 34.2 SEC   MSSA/MRSA Screen BY PCR    Collection Time: 12/12/22  9:34 AM    Specimen: Nares; Swab   Result Value Ref Range    Special Requests NO SPECIAL REQUESTS      Culture        SA target not detected. A MRSA NEGATIVE, SA NEGATIVE test result does not preclude MRSA or SA nasal colonization. EKG 12 Lead    Collection Time: 12/12/22  9:39 AM   Result Value Ref Range    Ventricular Rate 82 BPM    QRS Duration 84 ms    Q-T Interval 354 ms    QTc Calculation (Bazett) 413 ms    R Axis 40 degrees    T Axis 39 degrees    Diagnosis Atrial fibrillation          Problem List:  )  Patient Active Problem List   Diagnosis    Osteoarthritis of right knee    Status post total knee replacement, right    Degenerative arthritis of left knee       Total Joint Surgery Pre-Assessment Recommendations:         Pt with irregular HR at PAT appt. EKG completed today - pt in afib  with controlled rate (known hx of afib). Has cardiac clearance appt 12/14/22 for surgery clearance. Patient reports the symptoms of snoring, fatigue, observed apnea and /or excessive daytime sleepiness. Will refer patient for HST based on above assessment. Recommend continuous saturation monitoring hours of sleep, during hospitalization.       Signed By: JODY Swenson - CNP-C    December 12, 2022

## 2022-12-12 NOTE — PROGRESS NOTES
12/12/22 0930   Treatment   Treatment Type Bedside spirometry   Oxygen Therapy/Pulse Ox   O2 Therapy Room air   Heart Rate 85   SpO2 96 %   Pulse Oximeter Device Mode Intermittent   $Pulse Oximeter $Spot check (single)   Bedside Spirometry   FEV-1/Actual (Liters) 3.55 Liters   FEV-1/Predicted (Liters) 83 Liters   Pt's symptoms include:    Snoring  Tiredness- excessive daytime sleepiness  Observed apnea  Waking up from sleep gasping or choking  Increased BP  Neck size         45.5     cm  Modified Aguilar stage 4  SACS Score 58    Height    6  '  5  \"   Weight   290  lbs  BMI 34.39      Refer patient for sleep study based on above assessment. HST  Phone number:  [phone]      Initial respiratory Assessment completed with pt. Pt was interviewed and evaluated in Joint camp prior to surgery. Patient ID:  Fatou Del Cid  [id number]  75 y.o.  1955  Surgeon: Dr. Aisha Goodwin  Date of Surgery: [email or web address]  Procedure: Total Left Knee Arthroplasty  Primary Care Physician: Lakisha Roth -[phone]  Specialists:     BS:      Pt taught proper COUGH technique  IS REVIEWED WITH PT AS WELL AS BENEFITS OF USING IS IN SEDENTARY PTS. DIAPHRAGMATIC BREATHING EXERCISE INSTRUCTIONS GIVEN    History of smoking:   DENIES                 Quit date:         Secondhand smoke:DENIES    Past procedures with Oxygen desaturation or delayed awakening:DENIES    Past Medical History:   Diagnosis Date    Arthritis     Atrial fibrillation ([facility] [address] Dx 5/2019    KLEBER and DCCV performed (6/20/19) and he has maintained NSR since.  EKG dated 7/19/19 shows NRS    BPH associated with nocturia     managed with medication    Chronic pain     lower back and legs    H/O echocardiogram 07/18/2019    EF 55-65%    LBBB (left bundle branch block)     Stress (5/21/19) \"no ischemia,\" Echo (7/18/19) EF 55-65%    Status post total knee replacement, right 9/11/2019    HX OF COVID- PT STATES HE WAS VERY SICK  HX OF Only few Lots of losartan have been recalled. Otherwise no other FDA warnings against the drug yet. Lower patients are on the medication and very continuing them on the same meds. But if patient is really uncomfortable I can switch the medication.   PNA  Respiratory history:DENIES SOB                                                                   Respiratory meds:  DENIES    FAMILY PRESENT:             NO     PAST SLEEP STUDY:                      DENIES  HX OF SAMANTHA:                                        DENIES  SAMANTHA assessment:    HX OF PERSISTENT A-FIB, DANGERS OF UNTREATED SAMANTHA EXPLAINED TO PT.                                          SLEEPS ON SIDE       &      BACK           PHYSICAL EXAM   Body mass index is 34.39 kg/m². Vitals:    12/12/22 0948   BP: 121/81   Pulse: 90   Resp: 14   Temp: 98.2 °F (36.8 °C)   SpO2: 96%     Neck circumference:   45.5   cm    Loud snoring:                                                 YES             Witnessed apnea or wakening gasping or choking:             APNEA  Awakens with headaches:                                               DENIES  Morning or daytime tiredness/ sleepiness:                            TIRED  Dry mouth or sore throat in morning:                   DENIES                                   Aguilar stage:  4                                   SACS score:58                                 CS HS  RESPIRATORY ASSESSMENT Q SHIFT   O2 PRN    ALBUTEROL  NEBULIZER Q6 PRN WHEEZING                                               Referrals:  HST  Pt.  Phone Number:

## 2022-12-12 NOTE — PERIOP NOTE
PLEASE CONTINUE TAKING ALL PRESCRIPTION MEDICATIONS UP TO THE DAY OF SURGERY UNLESS OTHERWISE DIRECTED BELOW. DISCONTINUE all vitamins, herbals and supplements 21 days prior to surgery. DISCONTINUE Non-Steriodal Anti-Inflammatory (NSAIDS) such as Advil, Ibuprofen, Motrin, Naproxen and Aleve 5 days prior to surgery. Home Medications to take  the day of surgery      none           Home Medications   to Hold           Comments   Take 1 capful of Miralax daily starting one week prior to surgery. On the day before surgery please take Acetaminophen 1000mg in the morning and then again before bed. You may substitute for Tylenol 650 mg.    Bring Dynahex wash and Incentive Spirometer with you to hospital on the day of surgery. Please do not bring home medications with you on the day of surgery unless otherwise directed by your nurse. If you are instructed to bring home medications, please give them to your nurse as they will be administered by the nursing staff. If you have any questions, please call Alexander Mustafa [phone]. A copy of this note was provided to the patient for reference.

## 2022-12-12 NOTE — PROGRESS NOTES
Rafael Arrington  : 1955  Primary: Medicare Part [employer] B  Secondary: [address] at [facility] [address].  [phone]        Physical Therapy Prehab Evaluation Summary:2022   Time In/Out   PT Charge Capture  Episode     MEDICAL/REFERRING DIAGNOSIS: Unilateral primary osteoarthritis, left knee [M17.12]  REFERRING PHYSICIAN: Saul Arias., *    ICD-10: Treatment Diagnosis:   Pain in Left Knee (M25.562)  Stiffness of Left Knee, Not elsewhere classified (M25.662)  Difficulty in walking, Not elsewhere classified (R26.2)    DATE OF SURGERY: 22  Assessment:   COMMENTS:  Pt. Lives alone and plans to go home with support from his daughter. He had a right tka 3 years ago. He thinks he has some medical equipment in the attic and will check before surgery. PROBLEM LIST:   (Impacting functional limitations):  Mr. Hailee Donaldson presents with the following lower extremity(s) problems:  Strength  Range of Motion  Home Exercise Program  Pain INTERVENTIONS PLANNED:   (Benefits and precautions of physical therapy have been discussed with the patient.)  Home Exercise Program  Educational Discussion       GOALS: (Goals have been discussed and agreed upon with patient.)  Discharge Goals: Time Frame: 1 Day  Patient will demonstrate independence with a home exercise program designed to increase strength, range of motion, and pain control to minimize functional deficits and optimize patient for total joint replacement. Subjective:   Past Medical History/Comorbidities:   Mr. Hailee Donaldson  has a past medical history of Arthritis, Atrial fibrillation ([address] BPH associated with nocturia, Chronic pain, H/O echocardiogram, LBBB (left bundle branch block), and Status post total knee replacement, right.   Mr. Hailee Donaldson  has a past surgical history that includes Knee arthroscopy (Right, ); ablation of dysrhythmic focus (2021); hernia repair (Right, 2014); Colonoscopy (2019); and Knee Arthroplasty (Right). Allergies:  Patient has no known allergies.     Current Medications:  Refer to pre-assessment nursing note    Home Set-Up/Prior Level of Function:  Lives With: Alone, Daughter  Type of Home: House  Home Layout: One level (3 steps)  Home Access: Stairs to enter without rails  Entrance Stairs - Number of Steps: 4  Bathroom Shower/Tub: Walk-in shower    Dominant Side:  Dominant Hand: : Right    Current Functional Status:   Ambulation:  [x] Independent  [] Walk Indoors Only  [] Walk Outdoors  [] Use Assistive Device  [] Use Wheelchair Only Dressing:  [x] Independent  Requires Assistance from Someone for:  [] Sock/Shoes  [] Pants  [] Everything   Bathing/Showering:   [x] Independent  [] Requires Assistance from Someone  [] Sponge Bath Only Household Activities:  [x] Routine house and yard work  [] Light Housework Only  [] None     Objective:   PAIN:   Pre-Treatment Pain  Pain Assessment: 0-10  Pain Level:  (9 at worst)  Pain Location: Knee  Pain Descriptors: Aching, Burnis Harry, [place]    Post Treatment: knee pain    Outcome Measure:   HOOS-JR:       KOOS-JR:  KOOS, Jr. Knee Survey Score: 14    LOWER EXTREMITY GROSS EVALUATION:  RIGHT LE   Within Functional Limits   Abnormal   Comments   Strength [x] []     Range of Motion [x] []        LEFT LE Within Functional Limits   Abnormal   Comments   Strength [] []  4/5   Range of Motion [] [] AROM LLE (degrees)  LLE AROM : Exceptions  L Knee Flexion (0-145): 107  L Knee Extension (0): 9     UPPER EXTREMITY GROSS EVALUATION:     Within Functional Limits   Abnormal   Comments   Strength [] []    Range of Motion [] []      SENSATION  Sensation [x]       COGNITION/  PERCEPTION: Intact Impaired (Comments):   Orientation [x]     Vision [x]     Hearing [x]     Cognition  [x]       TRANSFERS: I Mod I S SBA CGA Min Mod Max Total  NT x2 Comments:   Sit to Stand [x] [] [] [] [] [] [] [] [] [] []    Stand to Sit [x] [] [] [] [] [] [] [] [] [] []    Stand pivot [] [] [] [] [] [] [] [] [] [] []     [] [] [] [] [] [] [] [] [] [] []    I=Independent, Mod I=Modified Independent, S=Supervision, SBA=Standby Assistance, CGA=Contact Guard Assistance,   Min=Minimal Assistance, Mod=Moderate Assistance, Max=Maximal Assistance, Total=Total Assistance, NT=Not Tested    BALANCE: Good Fair+ Fair Fair- Poor NT Comments   Sitting Static [x] [] [] [] [] []    Sitting Dynamic [x] [] [] [] [] []              Standing Static [x] [] [] [] [] []    Standing Dynamic [] [x] [] [] [] []      Postural Assessment:   Rounded Shoulders    GAIT: I Mod I S SBA CGA Min Mod Max Total  NT x2 Comments:   Level of Assistance [x] [] [] [] [] [] [] [] [] [] []    Weightbearing Status       Distance  200 feet    Gait Quality Decreased stance    DME None     Stairs      Ramp     I=Independent, Mod I=Modified Independent, S=Supervision, SBA=Standby Assistance, CGA=Contact Guard Assistance,   Min=Minimal Assistance, Mod=Moderate Assistance, Max=Maximal Assistance, Total=Total Assistance, NT=Not Tested    TREATMENT:   EVALUATION: LOW COMPLEXITY: (Untimed Charge)    TREATMENT PLAN:   Effective Dates: 12/12/2022 TO 12/12/2022. Treatment/Session Assessment:  Patient was instructed in PT- HEP to increase strength and ROM in LEs. Answered all questions. Frequency/Duration: Patient to continue to perform home exercise program at least twice per day up until his surgery. EDUCATION: Education Given To: Patient  Education Provided: Role of Therapy, Home Exercise Program  Education Method: Demonstration, Verbal  Education Outcome: Verbalized understanding    MEDICAL NECESSITY: Mr. Hailee Donaldson is expected to optimize hislower extremity strength and ROM in preparation for joint replacement surgery. REASON FOR CONTINUED SERVICES: Achieve baseline assesment of musculoskeletal system, functional mobility and home environment. , educate in PT HEP in preparation for surgery, educate in hospital plan of care. COMPLIANCE WITH PROGRAM/EXERCISE: compliant most of the time. TOTAL TREATMENT DURATION:  Time In: 1005  Time Out: 1015  Minutes: 10    Regarding [facility]ot's therapy, I certify that the treatment plan above will be carried out by a therapist or under their direction.   Thank you for this referral,  Krystle Bautista, PT

## 2022-12-16 ENCOUNTER — TELEPHONE (OUTPATIENT)
Dept: ORTHOPEDIC SURGERY | Age: 67
End: 2022-12-16

## 2022-12-16 DIAGNOSIS — M17.12 PRIMARY OSTEOARTHRITIS OF LEFT KNEE: ICD-10-CM

## 2022-12-16 NOTE — TELEPHONE ENCOUNTER
He is asking for a call from [facility] to discuss his cardiology appt.

## 2022-12-16 NOTE — TELEPHONE ENCOUNTER
Spoke with patient, he explained that his cardiologist spoke with doctor who did his sx last and that they agreed it would be okay to continue with sx, possible risk and wants to discuss with jej before anything. I explained I will relate the message to alvaro and we will return his call with further information after speaking with [facility].

## 2022-12-21 ENCOUNTER — OFFICE VISIT (OUTPATIENT)
Dept: ORTHOPEDIC SURGERY | Age: 67
End: 2022-12-21

## 2022-12-21 DIAGNOSIS — M17.12 PRIMARY OSTEOARTHRITIS OF LEFT KNEE: Primary | ICD-10-CM

## 2022-12-21 NOTE — PROGRESS NOTES
[address] [facility]  Pre Operative History and Physical Exam    Patient ID:  Cyndi Verde  [id number]  40 y.o.  1955    Today: December 21, 2022           CC:  left knee pain    HPI:   The patient has  OA left knee. The patient was evaluated and examined during a consultation prior to this office visit. There have been no changes to the patient's orthopedic condition since the initial consultation. The patient has failed previous conservative treatment for this condition including antiinflammatories , and lifestyle modifications. The necessity for joint replacement is present. The patient will be admitted the day of surgery for left knee replacement. Past Medical/Surgical History:  Past Medical History:   Diagnosis Date    Arthritis     Atrial fibrillation ([address] Dx 5/2019    KLEBER and DCCV performed (6/20/19) and he has maintained NSR since.  EKG dated 7/19/19 shows NRS    BPH associated with nocturia     managed with medication    Chronic pain     lower back and legs    H/O echocardiogram 07/18/2019    EF 55-65%    LBBB (left bundle branch block)     Stress (5/21/19) \"no ischemia,\" Echo (7/18/19) EF 55-65%    Status post total knee replacement, right 9/11/2019     Past Surgical History:   Procedure Laterality Date    ABLATION OF DYSRHYTHMIC FOCUS  07/08/2021    cardioversion with ablation, EP study    COLONOSCOPY  2019 5 polyps removed    HERNIA REPAIR Right 2014    [facility] Dr. Felicity Gillis Right     KNEE ARTHROSCOPY Right 2005    States \"either 2005 or 2007\"        Allergies: No Known Allergies     Physical Exam:   General: NAD, Alert, Oriented, Appears their stated age     [de-identified]: NC/AT    Skin: No rashes, lesions or wounds seen      Psych: normal affect      Heart: Regular Rate, Rhythm     Lungs: unlabored respirations, no wheezing    Abdomen: Soft and non-distended     Ortho: Pain with limited ROM of the left knee    Neuro: no focal defects, moving extremities equally    Lymph: no lymphadenopathy     Meds:   Current Outpatient Medications   Medication Sig    Acetaminophen (TYLENOL PO) Take by mouth as needed     No current facility-administered medications for this visit.          Labs:  Hospital Outpatient Visit on 12/12/2022   Component Date Value Ref Range Status    Protime 12/12/2022 13.8  12.6 - 14.3 sec Final    INR 12/12/2022 1.1    Final    Comment: Suggested therapeutic INR range:  Venous thrombosis and embolus  2.0-3.0  Prosthetic heart valve         2.5-3.5  ** Note new reference range and method **      Hemoglobin A1C 12/12/2022 5.6  4.8 - 5.6 % Final    eAG 12/12/2022 114  mg/dL Final    Comment: Reference Range  Normal: 4.8-5.6  Diabetic >=6.5%  Normal       <5.7%      WBC 12/12/2022 5.6  4.3 - 11.1 K/uL Final    RBC 12/12/2022 4.48  4.23 - 5.6 M/uL Final    Hemoglobin 12/12/2022 14.3  13.6 - 17.2 g/dL Final    Hematocrit 12/12/2022 43.3  41.1 - 50.3 % Final    MCV 12/12/2022 96.7  82.0 - 102.0 FL Final    MCH 12/12/2022 31.9  26.1 - 32.9 PG Final    MCHC 12/12/2022 33.0  31.4 - 35.0 g/dL Final    RDW 12/12/2022 13.0  11.9 - 14.6 % Final    Platelets 20/79/0160 167  150 - 450 K/uL Final    MPV 12/12/2022 9.4  9.4 - 12.3 FL Final    nRBC 12/12/2022 0.00  0.0 - 0.2 K/uL Final    **Note: Absolute NRBC parameter is now reported with Hemogram**    Differential Type 12/12/2022 AUTOMATED    Final    Seg Neutrophils 12/12/2022 49  43 - 78 % Final    Lymphocytes 12/12/2022 37  13 - 44 % Final    Monocytes 12/12/2022 7  4.0 - 12.0 % Final    Eosinophils % 12/12/2022 6  0.5 - 7.8 % Final    Basophils 12/12/2022 1  0.0 - 2.0 % Final    Immature Granulocytes 12/12/2022 0  0.0 - 5.0 % Final    Segs Absolute 12/12/2022 2.7  1.7 - 8.2 K/UL Final    Absolute Lymph # 12/12/2022 2.1  0.5 - 4.6 K/UL Final    Absolute Mono # 12/12/2022 0.4  0.1 - 1.3 K/UL Final    Absolute Eos # 12/12/2022 0.3  0.0 - 0.8 K/UL Final    Basophils Absolute 12/12/2022 0.1  0.0 - 0.2 K/UL Final Absolute Immature Granulocyte 12/12/2022 0.0  0.0 - 0.5 K/UL Final    Sodium 12/12/2022 138  133 - 143 mmol/L Final    Potassium 12/12/2022 4.1  3.5 - 5.1 mmol/L Final    Chloride 12/12/2022 111 (A)  101 - 110 mmol/L Final    CO2 12/12/2022 22  21 - 32 mmol/L Final    Anion Gap 12/12/2022 5  2 - 11 mmol/L Final    Glucose 12/12/2022 140 (A)  65 - 100 mg/dL Final    BUN 12/12/2022 18  8 - 23 MG/DL Final    Creatinine 12/12/2022 0.91  0.8 - 1.5 MG/DL Final    Est, Glom Filt Rate 12/12/2022 >60  >60 ml/min/1.73m2 Final    Comment:      Pediatric calculator link: Penxy.at. org/professionals/kdoqi/gfr_calculatorped       These results are not intended for use in patients <25years of age. eGFR results are calculated without a race factor using  the 2021 CKD-EPI equation. Careful clinical correlation is recommended, particularly when comparing to results calculated using previous equations. The CKD-EPI equation is less accurate in patients with extremes of muscle mass, extra-renal metabolism of creatinine, excessive creatine ingestion, or following therapy that affects renal tubular secretion. Calcium 12/12/2022 8.8  8.3 - 10.4 MG/DL Final    PTT 12/12/2022 28.5  24.5 - 34.2 SEC Final    Comment: Heparin Therapeutic Range = 74 - 123 seconds  In addition to factor deficiency, monitoring heparin therapy, etc., evaluation of a prolonged aPTT result should include consideration of preanalytic variables such as heparin flush contamination, specimen integrity issues, etc.      Special Requests 12/12/2022 NO SPECIAL REQUESTS    Final    Culture 12/12/2022 SA target not detected. A MRSA NEGATIVE, SA NEGATIVE test result does not preclude MRSA or SA nasal colonization.     Final    Ventricular Rate 12/12/2022 82  BPM Final    QRS Duration 12/12/2022 84  ms Final    Q-T Interval 12/12/2022 354  ms Final    QTc Calculation (Bazett) 12/12/2022 413  ms Final    R Axis 12/12/2022 40 degrees Final    T Axis 12/12/2022 39  degrees Final    Diagnosis 12/12/2022 Atrial fibrillation   Final                 Patient Active Problem List   Diagnosis    Osteoarthritis of right knee    Status post total knee replacement, right    Degenerative arthritis of left knee    Snoring         Assessment:   1. OA left knee      Plan:    1. Proceed with scheduled left knee replacement               All material risks, benefits, and reasonable alternatives including postponing the procedure were discussed. The patient does wish to proceed with the procedure at this time.

## 2022-12-27 ENCOUNTER — TELEPHONE (OUTPATIENT)
Dept: ORTHOPEDIC SURGERY | Age: 67
End: 2022-12-27

## 2022-12-27 NOTE — TELEPHONE ENCOUNTER
Spoke with patient and will cancel the surgery and he will let me know what the cardiologist says at his next visit

## 2022-12-27 NOTE — TELEPHONE ENCOUNTER
Patient lvm that he is sick and needs to cancel his surgery for this week.  Would like for you to please call him

## 2023-04-04 ENCOUNTER — HOSPITAL ENCOUNTER (OUTPATIENT)
Dept: SLEEP CENTER | Age: 68
Discharge: HOME | End: 2023-04-07

## 2023-04-25 ENCOUNTER — OFFICE VISIT (OUTPATIENT)
Dept: SLEEP MEDICINE | Age: 68
End: 2023-04-25
Payer: MEDICARE

## 2023-04-25 VITALS
OXYGEN SATURATION: 95 % | DIASTOLIC BLOOD PRESSURE: 80 MMHG | HEIGHT: 77 IN | HEART RATE: 53 BPM | WEIGHT: 290 LBS | RESPIRATION RATE: 16 BRPM | SYSTOLIC BLOOD PRESSURE: 120 MMHG | BODY MASS INDEX: 34.24 KG/M2 | TEMPERATURE: 97 F

## 2023-04-25 DIAGNOSIS — Z87.891 HISTORY OF SMOKING 25-50 PACK YEARS: ICD-10-CM

## 2023-04-25 DIAGNOSIS — E66.9 OBESITY (BMI 30-39.9): ICD-10-CM

## 2023-04-25 DIAGNOSIS — R09.02 HYPOXEMIA: ICD-10-CM

## 2023-04-25 DIAGNOSIS — G47.33 OSA (OBSTRUCTIVE SLEEP APNEA): Primary | ICD-10-CM

## 2023-04-25 DIAGNOSIS — I48.0 PAROXYSMAL ATRIAL FIBRILLATION (HCC): ICD-10-CM

## 2023-04-25 PROCEDURE — 99204 OFFICE O/P NEW MOD 45 MIN: CPT | Performed by: INTERNAL MEDICINE

## 2023-04-25 PROCEDURE — 1036F TOBACCO NON-USER: CPT | Performed by: INTERNAL MEDICINE

## 2023-04-25 PROCEDURE — 3017F COLORECTAL CA SCREEN DOC REV: CPT | Performed by: INTERNAL MEDICINE

## 2023-04-25 PROCEDURE — G8427 DOCREV CUR MEDS BY ELIG CLIN: HCPCS | Performed by: INTERNAL MEDICINE

## 2023-04-25 PROCEDURE — G8417 CALC BMI ABV UP PARAM F/U: HCPCS | Performed by: INTERNAL MEDICINE

## 2023-04-25 PROCEDURE — 1123F ACP DISCUSS/DSCN MKR DOCD: CPT | Performed by: INTERNAL MEDICINE

## 2023-04-25 RX ORDER — HYDROCODONE BITARTRATE AND ACETAMINOPHEN 5; 325 MG/1; MG/1
1 TABLET ORAL EVERY 6 HOURS PRN
COMMUNITY

## 2023-04-25 RX ORDER — FLECAINIDE ACETATE 100 MG/1
100 TABLET ORAL 2 TIMES DAILY
COMMUNITY
Start: 2023-03-09

## 2023-04-25 RX ORDER — APIXABAN 5 MG/1
TABLET, FILM COATED ORAL
COMMUNITY
Start: 2023-04-11

## 2023-04-25 RX ORDER — METOPROLOL SUCCINATE 25 MG/1
25 TABLET, EXTENDED RELEASE ORAL DAILY
COMMUNITY
Start: 2023-03-12

## 2023-04-25 ASSESSMENT — SLEEP AND FATIGUE QUESTIONNAIRES
ESS TOTAL SCORE: 16
HOW LIKELY ARE YOU TO NOD OFF OR FALL ASLEEP WHILE WATCHING TV: 3
HOW LIKELY ARE YOU TO NOD OFF OR FALL ASLEEP WHILE SITTING QUIETLY AFTER LUNCH WITHOUT ALCOHOL: 3
HOW LIKELY ARE YOU TO NOD OFF OR FALL ASLEEP WHILE SITTING INACTIVE IN A PUBLIC PLACE: 1
HOW LIKELY ARE YOU TO NOD OFF OR FALL ASLEEP WHILE LYING DOWN TO REST IN THE AFTERNOON WHEN CIRCUMSTANCES PERMIT: 3
HOW LIKELY ARE YOU TO NOD OFF OR FALL ASLEEP WHEN YOU ARE A PASSENGER IN A CAR FOR AN HOUR WITHOUT A BREAK: 3
HOW LIKELY ARE YOU TO NOD OFF OR FALL ASLEEP WHILE SITTING AND READING: 3
HOW LIKELY ARE YOU TO NOD OFF OR FALL ASLEEP WHILE SITTING AND TALKING TO SOMEONE: 0
HOW LIKELY ARE YOU TO NOD OFF OR FALL ASLEEP IN A CAR, WHILE STOPPED FOR A FEW MINUTES IN TRAFFIC: 0

## 2023-04-25 ASSESSMENT — ENCOUNTER SYMPTOMS
EYES NEGATIVE: 1
RESPIRATORY NEGATIVE: 1
ALLERGIC/IMMUNOLOGIC NEGATIVE: 1

## 2023-04-25 NOTE — PATIENT INSTRUCTIONS
The company who will be taking care of your CPAPLearning About Sleeping Well  What does sleeping well mean? Sleeping well means getting enough sleep. How much sleep is enough varies among people. The number of hours you sleep is not as important as how you feel when you wake up. If you do not feel refreshed, you probably need more sleep. Another sign of not getting enough sleep is feeling tired during the day. The average total nightly sleep time is 7½ to 8 hours. Healthy adults may need a little more or a little less than this. Why is getting enough sleep important? Getting enough quality sleep is a basic part of good health. When your sleep suffers, your mood and your thoughts can suffer too. You may find yourself feeling more grumpy or stressed. Not getting enough sleep also can lead to serious problems, including injury, accidents, anxiety, and depression. What might cause poor sleeping? Many things can cause sleep problems, including:  Stress. Stress can be caused by fear about a single event, such as giving a speech. Or you may have ongoing stress, such as worry about work or school. Depression, anxiety, and other mental or emotional conditions. Changes in your sleep habits or surroundings. This includes changes that happen where you sleep, such as noise, light, or sleeping in a different bed. It also includes changes in your sleep pattern, such as having jet lag or working a late shift. Health problems, such as pain, breathing problems, and restless legs syndrome. Lack of regular exercise. How can you help yourself? Here are some tips that may help you sleep more soundly and wake up feeling more refreshed. Your sleeping area   Use your bedroom only for sleeping and sex. A bit of light reading may help you fall asleep. But if it doesn't, do your reading elsewhere in the house. Don't watch TV in bed.   Be sure your bed is big enough to stretch out comfortably, especially if you have a sleep

## 2023-07-05 ENCOUNTER — TELEPHONE (OUTPATIENT)
Dept: SLEEP MEDICINE | Age: 68
End: 2023-07-05

## 2023-07-05 NOTE — TELEPHONE ENCOUNTER
----- Message from Tianna Toribio MD sent at 7/3/2023  4:33 PM EDT -----  The overnight is fine on CPAP. Please let the patient know.     Tianna Toribio MD

## 2023-07-31 DIAGNOSIS — R09.02 HYPOXEMIA: Primary | ICD-10-CM

## 2023-08-04 NOTE — PROGRESS NOTES
[address]: Follow-up visit  [address] [place] [address]  [phone]    Patient Name:  Jean Casillas    YOB: 1955            Date of Service:  4/25/2023    Chief Complaint   Patient presents with    Follow-up visit sleep apnea    Atrial fibrillation           History of Present Illness: This patient came in today for a follow-up visit. To recap, patient has sleep apnea AHI is 38.3 low sat 76%. Saturation less than 89% for 89 minutes. Patient placed on auto CPAP with a pressure of 7-15 cm H2O with C-Flex of 2 and using Azul fullface mask. Since then:  Patient reports he is using his machine, but his compliance is kind of coming down recently. Looking over the data he has used it for 83 at 84 days and 41 days more than 4 hours average sleep is 4 hours and 10 minutes. His median pressure is 9.1 cm H2O 95th percentile pressure is 11.8 cm H2O. Mean airleak is 116.5 cm H2O and 95th percentile air leak is 49.5 cm H2O. His AHI is 17.6. Further breakdown noted about 8 of those events per hour are central episodes. Patient indicates he does feel a little less tired than he did before but not by much. Denies any problems with the airway pressure, his mask or humidity. Patient reports that he is not sure if he is still in atrial fibrillation. Did have follow-up with Dr. Dennys Cruz on his colleague. Please noted been cardioverted multiple times currently on flecainide and wants to know if he can check to see if he is in atrial fibrillation today. Patient's weight is currently up from his last visit at 303.2 pounds. Patient did have overnight oximetry study since his last visit and on PAP therapy does not have even 1 minute with saturation less than or equal to 88%. Has only 34 seconds. Patient did complete PFTs which I reviewed and it shows patient has normal spirometry, normal lung volumes and normal diffusion capacity.   Follow-up

## 2023-08-07 ENCOUNTER — OFFICE VISIT (OUTPATIENT)
Dept: SLEEP MEDICINE | Age: 68
End: 2023-08-07
Payer: MEDICARE

## 2023-08-07 ENCOUNTER — HOSPITAL ENCOUNTER (OUTPATIENT)
Dept: PULMONOLOGY | Age: 68
Discharge: HOME | End: 2023-08-10
Payer: MEDICARE

## 2023-08-07 VITALS
DIASTOLIC BLOOD PRESSURE: 82 MMHG | WEIGHT: 303.2 LBS | TEMPERATURE: 97.5 F | SYSTOLIC BLOOD PRESSURE: 128 MMHG | BODY MASS INDEX: 35.8 KG/M2 | HEART RATE: 78 BPM | RESPIRATION RATE: 18 BRPM | HEIGHT: 77 IN | OXYGEN SATURATION: 96 %

## 2023-08-07 DIAGNOSIS — R09.02 HYPOXEMIA: ICD-10-CM

## 2023-08-07 DIAGNOSIS — G47.33 OSA (OBSTRUCTIVE SLEEP APNEA): Primary | ICD-10-CM

## 2023-08-07 DIAGNOSIS — E66.9 OBESITY (BMI 30-39.9): ICD-10-CM

## 2023-08-07 DIAGNOSIS — G47.31 CENTRAL SLEEP APNEA: ICD-10-CM

## 2023-08-07 DIAGNOSIS — I48.0 PAROXYSMAL ATRIAL FIBRILLATION (HCC): ICD-10-CM

## 2023-08-07 PROCEDURE — 3017F COLORECTAL CA SCREEN DOC REV: CPT | Performed by: INTERNAL MEDICINE

## 2023-08-07 PROCEDURE — 94726 PLETHYSMOGRAPHY LUNG VOLUMES: CPT

## 2023-08-07 PROCEDURE — 94729 DIFFUSING CAPACITY: CPT

## 2023-08-07 PROCEDURE — 99215 OFFICE O/P EST HI 40 MIN: CPT | Performed by: INTERNAL MEDICINE

## 2023-08-07 PROCEDURE — 94060 EVALUATION OF WHEEZING: CPT

## 2023-08-07 PROCEDURE — 1036F TOBACCO NON-USER: CPT | Performed by: INTERNAL MEDICINE

## 2023-08-07 PROCEDURE — G8417 CALC BMI ABV UP PARAM F/U: HCPCS | Performed by: INTERNAL MEDICINE

## 2023-08-07 PROCEDURE — G8427 DOCREV CUR MEDS BY ELIG CLIN: HCPCS | Performed by: INTERNAL MEDICINE

## 2023-08-07 PROCEDURE — 1123F ACP DISCUSS/DSCN MKR DOCD: CPT | Performed by: INTERNAL MEDICINE

## 2023-08-07 ASSESSMENT — SLEEP AND FATIGUE QUESTIONNAIRES
HOW LIKELY ARE YOU TO NOD OFF OR FALL ASLEEP WHILE LYING DOWN TO REST IN THE AFTERNOON WHEN CIRCUMSTANCES PERMIT: 3
HOW LIKELY ARE YOU TO NOD OFF OR FALL ASLEEP WHILE SITTING AND READING: 3
HOW LIKELY ARE YOU TO NOD OFF OR FALL ASLEEP WHILE SITTING INACTIVE IN A PUBLIC PLACE: 1
HOW LIKELY ARE YOU TO NOD OFF OR FALL ASLEEP WHILE SITTING AND TALKING TO SOMEONE: 0
HOW LIKELY ARE YOU TO NOD OFF OR FALL ASLEEP WHILE SITTING QUIETLY AFTER LUNCH WITHOUT ALCOHOL: 3
HOW LIKELY ARE YOU TO NOD OFF OR FALL ASLEEP IN A CAR, WHILE STOPPED FOR A FEW MINUTES IN TRAFFIC: 0
HOW LIKELY ARE YOU TO NOD OFF OR FALL ASLEEP WHEN YOU ARE A PASSENGER IN A CAR FOR AN HOUR WITHOUT A BREAK: 2
ESS TOTAL SCORE: 15
HOW LIKELY ARE YOU TO NOD OFF OR FALL ASLEEP WHILE WATCHING TV: 3

## 2023-08-25 ENCOUNTER — TELEPHONE (OUTPATIENT)
Dept: SLEEP MEDICINE | Age: 68
End: 2023-08-25

## 2023-08-25 NOTE — TELEPHONE ENCOUNTER
2 weeks download. Per note . - Adjust starting auto CPAP pressure at 9 and keep maximum of 15 with C-Flex 2 and auto ramp time.   - If no improvement in AHI by assessment in 2 weeks, we will need dedicated BiPAP to possible BiPAP ST titration

## 2023-08-28 DIAGNOSIS — G47.33 OSA (OBSTRUCTIVE SLEEP APNEA): Primary | ICD-10-CM

## 2023-08-30 ENCOUNTER — HOSPITAL ENCOUNTER (OUTPATIENT)
Dept: SLEEP CENTER | Age: 68
Discharge: HOME | End: 2023-09-02

## 2023-09-27 ENCOUNTER — OFFICE VISIT (OUTPATIENT)
Dept: SLEEP MEDICINE | Age: 68
End: 2023-09-27
Payer: MEDICARE

## 2023-09-27 VITALS
RESPIRATION RATE: 14 BRPM | WEIGHT: 311 LBS | OXYGEN SATURATION: 100 % | SYSTOLIC BLOOD PRESSURE: 136 MMHG | HEIGHT: 77 IN | BODY MASS INDEX: 36.72 KG/M2 | HEART RATE: 91 BPM | DIASTOLIC BLOOD PRESSURE: 80 MMHG

## 2023-09-27 DIAGNOSIS — R09.02 HYPOXEMIA: ICD-10-CM

## 2023-09-27 DIAGNOSIS — E66.9 OBESITY (BMI 30-39.9): ICD-10-CM

## 2023-09-27 DIAGNOSIS — G47.33 OSA (OBSTRUCTIVE SLEEP APNEA): Primary | ICD-10-CM

## 2023-09-27 DIAGNOSIS — G47.31 CENTRAL SLEEP APNEA: ICD-10-CM

## 2023-09-27 PROCEDURE — G8417 CALC BMI ABV UP PARAM F/U: HCPCS | Performed by: STUDENT IN AN ORGANIZED HEALTH CARE EDUCATION/TRAINING PROGRAM

## 2023-09-27 PROCEDURE — G8427 DOCREV CUR MEDS BY ELIG CLIN: HCPCS | Performed by: STUDENT IN AN ORGANIZED HEALTH CARE EDUCATION/TRAINING PROGRAM

## 2023-09-27 PROCEDURE — 1123F ACP DISCUSS/DSCN MKR DOCD: CPT | Performed by: STUDENT IN AN ORGANIZED HEALTH CARE EDUCATION/TRAINING PROGRAM

## 2023-09-27 PROCEDURE — 99214 OFFICE O/P EST MOD 30 MIN: CPT | Performed by: STUDENT IN AN ORGANIZED HEALTH CARE EDUCATION/TRAINING PROGRAM

## 2023-09-27 PROCEDURE — 1036F TOBACCO NON-USER: CPT | Performed by: STUDENT IN AN ORGANIZED HEALTH CARE EDUCATION/TRAINING PROGRAM

## 2023-09-27 PROCEDURE — 3017F COLORECTAL CA SCREEN DOC REV: CPT | Performed by: STUDENT IN AN ORGANIZED HEALTH CARE EDUCATION/TRAINING PROGRAM

## 2023-09-27 ASSESSMENT — SLEEP AND FATIGUE QUESTIONNAIRES
HOW LIKELY ARE YOU TO NOD OFF OR FALL ASLEEP IN A CAR, WHILE STOPPED FOR A FEW MINUTES IN TRAFFIC: 0
HOW LIKELY ARE YOU TO NOD OFF OR FALL ASLEEP WHEN YOU ARE A PASSENGER IN A CAR FOR AN HOUR WITHOUT A BREAK: 2
HOW LIKELY ARE YOU TO NOD OFF OR FALL ASLEEP WHILE SITTING AND TALKING TO SOMEONE: 0
HOW LIKELY ARE YOU TO NOD OFF OR FALL ASLEEP WHILE SITTING INACTIVE IN A PUBLIC PLACE: 1
HOW LIKELY ARE YOU TO NOD OFF OR FALL ASLEEP WHILE SITTING QUIETLY AFTER LUNCH WITHOUT ALCOHOL: 3
HOW LIKELY ARE YOU TO NOD OFF OR FALL ASLEEP WHILE WATCHING TV: 3
HOW LIKELY ARE YOU TO NOD OFF OR FALL ASLEEP WHILE SITTING AND READING: 3
HOW LIKELY ARE YOU TO NOD OFF OR FALL ASLEEP WHILE LYING DOWN TO REST IN THE AFTERNOON WHEN CIRCUMSTANCES PERMIT: 3
ESS TOTAL SCORE: 15

## 2023-09-27 NOTE — PROGRESS NOTES
Product type* /   Subscriber ID: [id number] - (Medicare)      AMB Supply Order  Order Details     DME Location: [facility]   Order Date: 9/27/2023   The primary encounter diagnosis was SAMANTHA (obstructive sleep apnea). Diagnoses of Central sleep apnea, Hypoxemia, and Obesity (BMI 30-39. 9) were also pertinent to this visit.           (  X   )New Set-Up     BiPAP machine   (     )[id number] CPAP Unit  (     ) Auto CPAP Unit  (     ) BiLevel Unit  ( x    ) Auto BiLevel Unit  (     ) ASV   (     ) Bilevel ST    ( x    ) Oxygen Concentrator         Length of need: 12 months    Pressure: 15/10  cmH20 with 2L O2 bleed in  EPR:      Starting Ramp Pressure:   cm H20  Ramp Time: min      Patient had a diagnostic Apnea Hypopnea Index (AHI) of :  38.3    *SUPPLIES* Replace all as needed, or per coverage guidelines     Masks Type:    (  x   ) -Full Face Mask (1 per 3 mon)  ( x    ) -Full Mask (1 per month) Interface/Cushion      (     ) -Nasal Mask (1 per 3 mon)  (     ) - Nasal Mask (1 per month) Interface/Cushion  (     ) -Pillow (2 per mon)  (     ) -Ldbphdono (1 per 6 mon)      _________________________________________________________________          Other Supplies:    (  X   )-Dqjqwteq (1 per 6 mon)  ( X    )-Mzocof Tubing (1 per 3 mon)  (  X   )- Disposable Filter (2 per mon)  (   X  )-Svqcnp Humidifier (1 per year)     (  x   )-Geyjzwdxj (sometimes used with Full Face Mask) (1 per 6 mos)  (     )-Tubing-without heat (1 per 3 mos)  ( X   )-Non-Disposable Filter (1 per 6 mos)  (   x  )-Water Chamber (1 per 6 mos)  (     )-Humidifier non-heated (1 per 5 yrs)      Signed Date: 9/27/2023  Electronically Signed By: JODY Benjamin - CNP       Collaborating Physician: Dr. Sharath Mueller office visit was spent  reviewing test results, prognosis, importance of compliance, education about disease process, benefits of

## 2024-01-16 NOTE — PROGRESS NOTES
[facility]  [address]  [place] [address]  [phone]    Patient Name:  Jack Denver Powell  YOB: 1955      Office Visit 1/17/2024    CHIEF COMPLAINT:    Chief Complaint   Patient presents with    Sleep Apnea         HISTORY OF PRESENT ILLNESS:  Patient is a 67 yo  female seen today for follow up of sleep apnea. Patient's sleep study in April 2023 revealed AHI of 38.3 with lowest desaturation 76%. He is prescribed bipap therapy with a humidifier set at 15/10cm with a 2L O2 bleed in using a full face mask. Most recent download reveals AHI on PAP therapy is 13.5- mostly unknowns, leak is 100.8 and the hourly usage is 3 hours 24 minutes nightly. The overall use is 333 hours with days greater than four hours at 30/105.      This is patient's first visit since starting BiPAP with oxygen.  He states his decreased compliance is related to waking up to go to the bathroom and not putting the machine back on.  He states his mask leak is related to not turning his machine off when he does this.  He states the pressure feels fine.  He is falling asleep easily and will awaken twice at night to use the bathroom.  He is rested in the mornings, does have some daytime fatigue and will nap on a Sunday.  Current Crum Lynne score is 3/24.  This is improved from 15/24 at previous visits. he states that overall he is feeling better with BiPAP.  We had a long discussion about increasing compliance and using this all night and he is agreeable to continuing to try.    Download        Past Medical History:   Diagnosis Date    Arthritis     Atrial fibrillation (HCC) Dx 5/2019    KLEBER and DCCV performed (6/20/19) and he has maintained NSR since. EKG dated 7/19/19 shows NRS    BPH associated with nocturia     managed with medication    Chronic pain     lower back and legs    H/O echocardiogram 07/18/2019    EF 55-65%    LBBB (left bundle branch block)     Stress (5/21/19) \"no ischemia,\"

## 2024-01-17 ENCOUNTER — OFFICE VISIT (OUTPATIENT)
Dept: SLEEP MEDICINE | Age: 69
End: 2024-01-17
Payer: MEDICARE

## 2024-01-17 VITALS
SYSTOLIC BLOOD PRESSURE: 118 MMHG | HEIGHT: 77 IN | BODY MASS INDEX: 37.19 KG/M2 | RESPIRATION RATE: 14 BRPM | WEIGHT: 315 LBS | OXYGEN SATURATION: 96 % | HEART RATE: 79 BPM | DIASTOLIC BLOOD PRESSURE: 70 MMHG

## 2024-01-17 DIAGNOSIS — R09.02 HYPOXEMIA: ICD-10-CM

## 2024-01-17 DIAGNOSIS — G47.31 CENTRAL SLEEP APNEA: ICD-10-CM

## 2024-01-17 DIAGNOSIS — E66.9 OBESITY (BMI 30-39.9): ICD-10-CM

## 2024-01-17 DIAGNOSIS — G47.33 OSA (OBSTRUCTIVE SLEEP APNEA): Primary | ICD-10-CM

## 2024-01-17 DIAGNOSIS — I48.0 PAROXYSMAL ATRIAL FIBRILLATION (HCC): ICD-10-CM

## 2024-01-17 PROCEDURE — 1036F TOBACCO NON-USER: CPT | Performed by: STUDENT IN AN ORGANIZED HEALTH CARE EDUCATION/TRAINING PROGRAM

## 2024-01-17 PROCEDURE — 3017F COLORECTAL CA SCREEN DOC REV: CPT | Performed by: STUDENT IN AN ORGANIZED HEALTH CARE EDUCATION/TRAINING PROGRAM

## 2024-01-17 PROCEDURE — G8417 CALC BMI ABV UP PARAM F/U: HCPCS | Performed by: STUDENT IN AN ORGANIZED HEALTH CARE EDUCATION/TRAINING PROGRAM

## 2024-01-17 PROCEDURE — G8484 FLU IMMUNIZE NO ADMIN: HCPCS | Performed by: STUDENT IN AN ORGANIZED HEALTH CARE EDUCATION/TRAINING PROGRAM

## 2024-01-17 PROCEDURE — 1123F ACP DISCUSS/DSCN MKR DOCD: CPT | Performed by: STUDENT IN AN ORGANIZED HEALTH CARE EDUCATION/TRAINING PROGRAM

## 2024-01-17 PROCEDURE — G8427 DOCREV CUR MEDS BY ELIG CLIN: HCPCS | Performed by: STUDENT IN AN ORGANIZED HEALTH CARE EDUCATION/TRAINING PROGRAM

## 2024-01-17 PROCEDURE — 99213 OFFICE O/P EST LOW 20 MIN: CPT | Performed by: STUDENT IN AN ORGANIZED HEALTH CARE EDUCATION/TRAINING PROGRAM

## 2024-01-17 ASSESSMENT — SLEEP AND FATIGUE QUESTIONNAIRES
HOW LIKELY ARE YOU TO NOD OFF OR FALL ASLEEP WHILE SITTING INACTIVE IN A PUBLIC PLACE: 0
HOW LIKELY ARE YOU TO NOD OFF OR FALL ASLEEP WHILE SITTING AND READING: 0
HOW LIKELY ARE YOU TO NOD OFF OR FALL ASLEEP WHILE SITTING QUIETLY AFTER LUNCH WITHOUT ALCOHOL: 0
HOW LIKELY ARE YOU TO NOD OFF OR FALL ASLEEP WHILE WATCHING TV: 3
HOW LIKELY ARE YOU TO NOD OFF OR FALL ASLEEP WHILE SITTING AND TALKING TO SOMEONE: 0
HOW LIKELY ARE YOU TO NOD OFF OR FALL ASLEEP WHILE LYING DOWN TO REST IN THE AFTERNOON WHEN CIRCUMSTANCES PERMIT: 0
HOW LIKELY ARE YOU TO NOD OFF OR FALL ASLEEP WHEN YOU ARE A PASSENGER IN A CAR FOR AN HOUR WITHOUT A BREAK: 0
HOW LIKELY ARE YOU TO NOD OFF OR FALL ASLEEP IN A CAR, WHILE STOPPED FOR A FEW MINUTES IN TRAFFIC: 0
ESS TOTAL SCORE: 3

## 2024-06-25 ENCOUNTER — TELEMEDICINE (OUTPATIENT)
Dept: SLEEP MEDICINE | Age: 69
End: 2024-06-25
Payer: MEDICARE

## 2024-06-25 DIAGNOSIS — R09.02 HYPOXEMIA: ICD-10-CM

## 2024-06-25 DIAGNOSIS — G47.31 CENTRAL SLEEP APNEA: ICD-10-CM

## 2024-06-25 DIAGNOSIS — E66.9 OBESITY (BMI 30-39.9): ICD-10-CM

## 2024-06-25 DIAGNOSIS — G47.33 OSA (OBSTRUCTIVE SLEEP APNEA): Primary | ICD-10-CM

## 2024-06-25 PROCEDURE — 1123F ACP DISCUSS/DSCN MKR DOCD: CPT | Performed by: STUDENT IN AN ORGANIZED HEALTH CARE EDUCATION/TRAINING PROGRAM

## 2024-06-25 PROCEDURE — 99214 OFFICE O/P EST MOD 30 MIN: CPT | Performed by: STUDENT IN AN ORGANIZED HEALTH CARE EDUCATION/TRAINING PROGRAM

## 2024-06-25 PROCEDURE — G2211 COMPLEX E/M VISIT ADD ON: HCPCS | Performed by: STUDENT IN AN ORGANIZED HEALTH CARE EDUCATION/TRAINING PROGRAM

## 2024-06-25 PROCEDURE — G8427 DOCREV CUR MEDS BY ELIG CLIN: HCPCS | Performed by: STUDENT IN AN ORGANIZED HEALTH CARE EDUCATION/TRAINING PROGRAM

## 2024-06-25 PROCEDURE — 3017F COLORECTAL CA SCREEN DOC REV: CPT | Performed by: STUDENT IN AN ORGANIZED HEALTH CARE EDUCATION/TRAINING PROGRAM

## 2024-06-25 ASSESSMENT — SLEEP AND FATIGUE QUESTIONNAIRES
HOW LIKELY ARE YOU TO NOD OFF OR FALL ASLEEP WHILE WATCHING TV: MODERATE CHANCE OF DOZING
HOW LIKELY ARE YOU TO NOD OFF OR FALL ASLEEP WHILE LYING DOWN TO REST IN THE AFTERNOON WHEN CIRCUMSTANCES PERMIT: HIGH CHANCE OF DOZING
HOW LIKELY ARE YOU TO NOD OFF OR FALL ASLEEP WHEN YOU ARE A PASSENGER IN A CAR FOR AN HOUR WITHOUT A BREAK: HIGH CHANCE OF DOZING
HOW LIKELY ARE YOU TO NOD OFF OR FALL ASLEEP WHILE SITTING QUIETLY AFTER LUNCH WITHOUT ALCOHOL: HIGH CHANCE OF DOZING
HOW LIKELY ARE YOU TO NOD OFF OR FALL ASLEEP WHILE SITTING AND TALKING TO SOMEONE: WOULD NEVER DOZE
HOW LIKELY ARE YOU TO NOD OFF OR FALL ASLEEP WHILE SITTING INACTIVE IN A PUBLIC PLACE: WOULD NEVER DOZE
HOW LIKELY ARE YOU TO NOD OFF OR FALL ASLEEP IN A CAR, WHILE STOPPED FOR A FEW MINUTES IN TRAFFIC: SLIGHT CHANCE OF DOZING
HOW LIKELY ARE YOU TO NOD OFF OR FALL ASLEEP WHILE SITTING AND READING: MODERATE CHANCE OF DOZING
ESS TOTAL SCORE: 14

## 2024-06-25 NOTE — PROGRESS NOTES
[facility]  [address]  [place], [place] [address] [phone]    Patient Name:  Jack Denver Powell  YOB: 1955    Jack Denver Powell, was evaluated through a synchronous (real-time) audio-video encounter. The patient (or guardian if applicable) is aware that this is a billable service, which includes applicable co-pays. This Virtual Visit was conducted with patient's (and/or legal guardian's) consent. Patient identification was verified, and a caregiver was present when appropriate.   The patient was located at Home: [address]  [facility] [address]  Provider was located at Home (Appt Dept State): [place]  Confirm you are appropriately licensed, registered, or certified to deliver care in the state where the patient is located as indicated above. If you are not or unsure, please re-schedule the visit: Yes, I confirm.          --JODY Cordero - CNP on 6/25/2024 at 10:28 AM             Office Visit 6/25/2024    CHIEF COMPLAINT:    Chief Complaint   Patient presents with    Sleep Apnea    Follow-up       HISTORY OF PRESENT ILLNESS:  Patient is being seen today via virtual visit. Patient's sleep study in April 2023 revealed AHI of 38.3 with lowest desaturation 76%. He is prescribed bipap therapy with a humidifier set at 15/10cm with a 2L O2 bleed in using a full face mask. Most recent download reveals AHI on PAP therapy is 16.6- mostly unknowns, leak is 98.7 and the hourly usage is 3 hours 40 minutes nightly. The overall use is 572 hours with days greater than four hours at 55/159. Patient has used machine 158/159 days total. The patient is compliant with the Pap therapy and is feeling better as a result.     Since last visit patient's compliance has increased some.  He is rested in morning, does have some daytime fatigue and may nap on occasion.  Current Mondovi score is 14/24.  He states he does wish he had more energy.  We did discuss increasing compliance on BiPAP 
[x]  None visible   [] Abnormal -    HENT: [x] Normocephalic, atraumatic  [] Abnormal -  [x] Mouth/Throat: Mucous membranes are moist    External Ears [x] Normal  [] Abnormal -    Neck: [x] No visualized mass [] Abnormal -     Pulmonary/Chest: [x] Respiratory effort normal   [x] No visualized signs of difficulty breathing or respiratory distress        [] Abnormal -      Musculoskeletal:   [x] Normal gait with no signs of ataxia         [x] Normal range of motion of neck        [] Abnormal -     Neurological:        [x] No Facial Asymmetry (Cranial nerve 7 motor function) (limited exam due to video visit)          [x] No gaze palsy        [] Abnormal -         Skin:        [x] No significant exanthematous lesions or discoloration noted on facial skin         [] Abnormal -            Psychiatric:       [x] Normal Affect [] Abnormal -       [x] No Hallucinations    Other pertinent observable physical exam findings:-      ASSESSMENT:  (Medical Decision Making)   {No diagnosis found. (Refresh or delete this SmartLink)}     PLAN:      No orders of the defined types were placed in this encounter.    No orders of the defined types were placed in this encounter.         Collaborating Physician: Dr. Camp      I spent at least *** minutes with this established patient, and >50% of the time was spent counseling and/or coordinating care regarding ***.        Mara Marino MA  Electronically signed

## 2025-05-01 ENCOUNTER — OFFICE VISIT (OUTPATIENT)
Dept: ORTHOPEDIC SURGERY | Age: 70
End: 2025-05-01
Payer: MEDICARE

## 2025-05-01 DIAGNOSIS — Z96.651 STATUS POST TOTAL KNEE REPLACEMENT, RIGHT: ICD-10-CM

## 2025-05-01 DIAGNOSIS — M17.12 PRIMARY OSTEOARTHRITIS OF LEFT KNEE: Primary | ICD-10-CM

## 2025-05-01 PROCEDURE — 99205 OFFICE O/P NEW HI 60 MIN: CPT | Performed by: ORTHOPAEDIC SURGERY

## 2025-05-01 PROCEDURE — G8421 BMI NOT CALCULATED: HCPCS | Performed by: ORTHOPAEDIC SURGERY

## 2025-05-01 PROCEDURE — 3017F COLORECTAL CA SCREEN DOC REV: CPT | Performed by: ORTHOPAEDIC SURGERY

## 2025-05-01 PROCEDURE — 1123F ACP DISCUSS/DSCN MKR DOCD: CPT | Performed by: ORTHOPAEDIC SURGERY

## 2025-05-01 PROCEDURE — G8428 CUR MEDS NOT DOCUMENT: HCPCS | Performed by: ORTHOPAEDIC SURGERY

## 2025-05-01 PROCEDURE — 1036F TOBACCO NON-USER: CPT | Performed by: ORTHOPAEDIC SURGERY

## 2025-05-01 NOTE — PROGRESS NOTES
Name: Jack Denver Powell  YOB: 1955  Gender: male  MRN: [id number]    CC:   Chief Complaint   Patient presents with    Follow-up     Nii rt tka and nii lt knee OA         HPI: Jack Denver Powell is a 70 y.o. male with a  has a past medical history of Arthritis, Atrial fibrillation (HCC), BPH associated with nocturia, Chronic pain, H/O echocardiogram, LBBB (left bundle branch block), and Status post total knee replacement, right. here for evaluation of left knee pain.  The pain has been present for years and is becoming worse. The pain is primarily on the Medial side.   he describes the pain as a constant ache that is intermittently sharp with activity  The pain is worse with any weight bearing, going up and/or down stairs, rising after sitting, and at night, often waking them from sleep  The pain does not radiate down the leg.  he denies numbness and tingling down the leg.   Treatment so far has been activity modification and Tylenol without relief.      No Known Allergies      Review of Systems:  As per HPI.  Pertinent positives and negatives are addressed with the patient, particularly those related to musculoskeletal concerns.   Non-orthopaedic concerns were referred back to the primary care physician.    PHYSICAL EXAMINATION:   The patient appears their stated age and they are in no distress.  There were no vitals taken for this visit.      The lower extremities are as described below.  Circulation is normal with palpable pedal pulses bilaterally and no edema.  There is no lymph adenopathy in the popliteal or malleolar region.  The skin is without stasis disease distally bilaterally.  Sensation is intact to light touch bilaterally.  There is moderate tenderness to palpation over the medial joint line of the right knee(s)  The gait is noted to be severe antalgic  Range of motion is 0-120 degrees on the right and 0-120 degrees on the left.  right knee: There is 2mm of anterior/posterior translation

## 2025-06-25 NOTE — PROGRESS NOTES
is using bipap again after cough has resolved. Mask leak is high and he will switch out supplies tonight.      2. Central sleep apnea  DME - DURABLE MEDICAL EQUIPMENT   Requiring bipap    3. Hypoxemia  DME - DURABLE MEDICAL EQUIPMENT   Requiring 2L oxygen. He is using and benefiting from oxygen on a nightly basis     4. Obesity (BMI 30-39.9)     He has lost weight and was congratulated on his efforts          PLAN:  Continue Bipap 15/10 cm with 2L oxygen into therapy  Nightly use encouraged on bipap and oxygen  Renew supplies  Check download next week to monitor AHI  Follow up in 4 months or sooner if needed     Orders Placed This Encounter   Procedures    DME - DURABLE MEDICAL EQUIPMENT     [employer]    [facility]  Phone: [address]  [facility] [address]  Dept: [phone]      Patient Name: Jack Denver Powell  : 1955  Gender: male  Address: [address] [place] [address]  Patient phone: [phone] (home)       Primary Insurance: Payor: MEDICARE / Plan: MEDICARE PART A AND B / Product Type: *No Product type* /   Subscriber ID: [id number] - (Medicare)      AMB Supply Order  Order Details     DME Location:   Order Date: 2025   There were no encounter diagnoses.             (  X   )Supplies Needed       Bipap  Machine   (     ) CPAP Unit  (     ) Auto CPAP Unit  (     ) BiLevel Unit  (     ) Auto BiLevel Unit  (     ) ASV   (     ) Bilevel ST      Length of need: 12 months    Pressure:  15/10 cmH20 with 2L oxygen  EPR:      Starting Ramp Pressure:   cm H20  Ramp Time: min      Patient had a diagnostic Apnea Hypopnea Index (AHI) of :    *SUPPLIES* Replace all as needed, or per coverage guidelines     Masks Type:  ( x   ) -Full Face Mask (1 per 3 mon)  (  x  ) -Full Mask (1 per month) Interface/Cushion      (  ) -Nasal Mask (1 per 3 mon)  (  ) - Nasal Mask (1 per month) Interface/Cushion  (     )

## 2025-06-26 ENCOUNTER — TELEMEDICINE (OUTPATIENT)
Dept: SLEEP MEDICINE | Age: 70
End: 2025-06-26

## 2025-06-26 DIAGNOSIS — R09.02 HYPOXEMIA: ICD-10-CM

## 2025-06-26 DIAGNOSIS — G47.31 CENTRAL SLEEP APNEA: ICD-10-CM

## 2025-06-26 DIAGNOSIS — G47.33 OSA (OBSTRUCTIVE SLEEP APNEA): Primary | ICD-10-CM

## 2025-06-26 DIAGNOSIS — E66.9 OBESITY (BMI 30-39.9): ICD-10-CM

## 2025-06-26 ASSESSMENT — SLEEP AND FATIGUE QUESTIONNAIRES
ESS TOTAL SCORE: 11
HOW LIKELY ARE YOU TO NOD OFF OR FALL ASLEEP WHILE LYING DOWN TO REST IN THE AFTERNOON WHEN CIRCUMSTANCES PERMIT: MODERATE CHANCE OF DOZING
HOW LIKELY ARE YOU TO NOD OFF OR FALL ASLEEP WHEN YOU ARE A PASSENGER IN A CAR FOR AN HOUR WITHOUT A BREAK: SLIGHT CHANCE OF DOZING
HOW LIKELY ARE YOU TO NOD OFF OR FALL ASLEEP WHILE SITTING AND TALKING TO SOMEONE: WOULD NEVER DOZE
HOW LIKELY ARE YOU TO NOD OFF OR FALL ASLEEP WHILE WATCHING TV: HIGH CHANCE OF DOZING
HOW LIKELY ARE YOU TO NOD OFF OR FALL ASLEEP IN A CAR, WHILE STOPPED FOR A FEW MINUTES IN TRAFFIC: WOULD NEVER DOZE
HOW LIKELY ARE YOU TO NOD OFF OR FALL ASLEEP WHILE SITTING INACTIVE IN A PUBLIC PLACE: WOULD NEVER DOZE
HOW LIKELY ARE YOU TO NOD OFF OR FALL ASLEEP WHILE SITTING QUIETLY AFTER LUNCH WITHOUT ALCOHOL: MODERATE CHANCE OF DOZING
HOW LIKELY ARE YOU TO NOD OFF OR FALL ASLEEP WHILE SITTING AND READING: HIGH CHANCE OF DOZING

## 2025-07-23 DIAGNOSIS — M17.12 PRIMARY OSTEOARTHRITIS OF LEFT KNEE: Primary | ICD-10-CM

## (undated) DEVICE — PACK PROCEDURE SURG TOT KNEE

## (undated) DEVICE — SYR 50ML LR LCK 1ML GRAD NSAF --

## (undated) DEVICE — BIPOLAR SEALER 23-112-1 AQM 6.0: Brand: AQUAMANTYS ®

## (undated) DEVICE — SYR 10ML LUER LOK 1/5ML GRAD --

## (undated) DEVICE — TRAY PREP DRY W/ PREM GLV 2 APPL 6 SPNG 2 UNDPD 1 OVERWRAP

## (undated) DEVICE — X-LARGE COTTON GLOVE: Brand: DEROYAL

## (undated) DEVICE — 3000CC GUARDIAN II: Brand: GUARDIAN

## (undated) DEVICE — HANDPIECE SET WITH COAXIAL HIGH FLOW TIP AND SUCTION TUBE: Brand: INTERPULSE

## (undated) DEVICE — DRAPE,TOP,102X53,STERILE: Brand: MEDLINE

## (undated) DEVICE — SUTURE ABSRB X-1 REV CUT 1/2 CIR 22MM UD BRAID 27IN SZ 3-0 J458H

## (undated) DEVICE — SUT ETHBND 2 30IN LR DA GRN --

## (undated) DEVICE — STRYKER PERFORMANCE SERIES SAGITTAL BLADE: Brand: STRYKER PERFORMANCE SERIES

## (undated) DEVICE — BLADE SAW PAT RMR PILT H 51MM --

## (undated) DEVICE — 3M™ STERI-DRAPE™ INSTRUMENT POUCH 1018: Brand: STERI-DRAPE™

## (undated) DEVICE — SOLUTION IRRIG 3000ML 0.9% SOD CHL FLX CONT 0797208] ICU MEDICAL INC]

## (undated) DEVICE — YANKAUER,BULB TIP,W/O VENT,RIGID,STERILE: Brand: MEDLINE

## (undated) DEVICE — OSCILLATING TIP SAW CARTRIDGE: Brand: STRYKER PRECISION

## (undated) DEVICE — SOLUTION IV DEXTROSE/SALINE 5%-0.9% 500ML - 500ML

## (undated) DEVICE — SOLUTION IV 1000ML 0.9% SOD CHL

## (undated) DEVICE — OSCILLATING TIP SAW CARTRIDGE: Brand: PRECISION FALCON

## (undated) DEVICE — (D)PREP SKN CHLRAPRP APPL 26ML -- CONVERT TO ITEM 371833

## (undated) DEVICE — SYRINGE CATH TIP 50ML

## (undated) DEVICE — T4 HOOD

## (undated) DEVICE — SUTURE PDS II SZ 1 L96IN ABSRB VLT TP-1 L65MM 1/2 CIR Z880G

## (undated) DEVICE — TRAY CATH 16F DRN BG LTX -- CONVERT TO ITEM 363158

## (undated) DEVICE — Device

## (undated) DEVICE — Z DISCONTINUED USE 2744636  DRESSING AQUACEL 14 IN ALG W3.5XL14IN POLYUR FLM CVR W/ HYDRCOLL

## (undated) DEVICE — SUTURE VCRL SZ 1 L27IN ABSRB UD L36MM CP-1 1/2 CIR REV CUT J268H

## (undated) DEVICE — BANDAGE COMPR SELF ADH 5 YDX4 IN TAN STRL PREMIERPRO LF

## (undated) DEVICE — 2000CC GUARDIAN II: Brand: GUARDIAN

## (undated) DEVICE — REM POLYHESIVE ADULT PATIENT RETURN ELECTRODE: Brand: VALLEYLAB

## (undated) DEVICE — BUTTON SWITCH PENCIL BLADE ELECTRODE, HOLSTER: Brand: EDGE

## (undated) DEVICE — SYR LR LCK 1ML GRAD NSAF 30ML --

## (undated) DEVICE — SUTURE STRATAFIX SYMMETRIC PDS + SZ 2-0 L18IN ABSRB VLT SXPP1A403